# Patient Record
Sex: MALE | Race: WHITE | NOT HISPANIC OR LATINO | URBAN - METROPOLITAN AREA
[De-identification: names, ages, dates, MRNs, and addresses within clinical notes are randomized per-mention and may not be internally consistent; named-entity substitution may affect disease eponyms.]

---

## 2017-02-24 ENCOUNTER — OUTPATIENT (OUTPATIENT)
Dept: OUTPATIENT SERVICES | Facility: HOSPITAL | Age: 52
LOS: 1 days | End: 2017-02-24
Payer: COMMERCIAL

## 2017-02-24 VITALS
OXYGEN SATURATION: 98 % | TEMPERATURE: 98 F | RESPIRATION RATE: 16 BRPM | DIASTOLIC BLOOD PRESSURE: 85 MMHG | SYSTOLIC BLOOD PRESSURE: 128 MMHG | HEIGHT: 75 IN | WEIGHT: 250 LBS | HEART RATE: 66 BPM

## 2017-02-24 DIAGNOSIS — S83.282A OTHER TEAR OF LATERAL MENISCUS, CURRENT INJURY, LEFT KNEE, INITIAL ENCOUNTER: ICD-10-CM

## 2017-02-24 DIAGNOSIS — Z01.818 ENCOUNTER FOR OTHER PREPROCEDURAL EXAMINATION: ICD-10-CM

## 2017-02-24 DIAGNOSIS — Z98.890 OTHER SPECIFIED POSTPROCEDURAL STATES: Chronic | ICD-10-CM

## 2017-02-24 DIAGNOSIS — G47.33 OBSTRUCTIVE SLEEP APNEA (ADULT) (PEDIATRIC): ICD-10-CM

## 2017-02-24 PROCEDURE — G0463: CPT

## 2017-02-24 RX ORDER — CELECOXIB 200 MG/1
200 CAPSULE ORAL ONCE
Qty: 0 | Refills: 0 | Status: COMPLETED | OUTPATIENT
Start: 2017-02-27 | End: 2017-02-27

## 2017-02-24 RX ORDER — ACETAMINOPHEN 500 MG
975 TABLET ORAL ONCE
Qty: 0 | Refills: 0 | Status: COMPLETED | OUTPATIENT
Start: 2017-02-27 | End: 2017-02-27

## 2017-02-24 RX ORDER — SODIUM CHLORIDE 9 MG/ML
3 INJECTION INTRAMUSCULAR; INTRAVENOUS; SUBCUTANEOUS EVERY 8 HOURS
Qty: 0 | Refills: 0 | Status: DISCONTINUED | OUTPATIENT
Start: 2017-02-27 | End: 2017-02-27

## 2017-02-24 NOTE — H&P PST ADULT - NEGATIVE GASTROINTESTINAL SYMPTOMS
no constipation/no change in bowel habits/no diarrhea/no vomiting/no abdominal pain/no flatulence/no nausea

## 2017-02-24 NOTE — H&P PST ADULT - NEGATIVE GENERAL SYMPTOMS
no fatigue/no fever/no sweating/no polyuria/no polydipsia/no weight gain/no polyphagia/no weight loss/no chills/no anorexia

## 2017-02-24 NOTE — H&P PST ADULT - GASTROINTESTINAL DETAILS
no masses palpable/no bruit/soft/nontender/no distention/no organomegaly/bowel sounds normal/no rigidity

## 2017-02-24 NOTE — H&P PST ADULT - HISTORY OF PRESENT ILLNESS
50 y/o male with PMH hyperlipidemia and Gilbert's syndrome is here for presurgical evaluation. He injured his left knee in work-related injury and is scheduled for left knee arthroscopy on 2/27/2017 52 y/o male with PMH hyperlipidemia and Gilbert's syndrome is here for presurgical evaluation. States he fell and injured his left knee in work-related incident. Patient is scheduled for left knee arthroscopy on 2/27/2017

## 2017-02-24 NOTE — H&P PST ADULT - RS GEN PE MLT RESP DETAILS PC
breath sounds equal/clear to auscultation bilaterally/airway patent/no wheezes/no intercostal retractions/good air movement/no rales/no chest wall tenderness/normal/no subcutaneous emphysema/no rhonchi/respirations non-labored

## 2017-02-24 NOTE — H&P PST ADULT - ASSESSMENT
52 y/o male with PMH hyperlipidemia, Gilbert's syndrome, and confirmed URMILA on CPAP 0.5cm water  scheduled for left knee arthroscopy on 2/27/2017. Patient is at low risk for planned procedure 50 y/o male with PMH hyperlipidemia, Gilbert's syndrome, confirmed URMILA on CPAP 0.5cm water, and tear of lateral meniscus, left knee scheduled for left knee arthroscopy on 2/27/2017. Patient is at low risk for planned procedure

## 2017-02-24 NOTE — H&P PST ADULT - FAMILY HISTORY
Father  Still living? No  Family history of cancer, Age at diagnosis: Age Unknown     Mother  Still living? Yes, Estimated age: Age Unknown  Diabetes mellitus, type 2, Age at diagnosis: Age Unknown

## 2017-02-24 NOTE — H&P PST ADULT - PROBLEM SELECTOR PLAN 1
Scheduled for left knee arthroscopy on 2/27/2017. Preoperative clearance discussed and given to patient

## 2017-02-24 NOTE — H&P PST ADULT - NEGATIVE CARDIOVASCULAR SYMPTOMS
no paroxysmal nocturnal dyspnea/no peripheral edema/no palpitations/no claudication/no dyspnea on exertion/no chest pain/no orthopnea

## 2017-02-26 ENCOUNTER — TRANSCRIPTION ENCOUNTER (OUTPATIENT)
Age: 52
End: 2017-02-26

## 2017-02-27 ENCOUNTER — OUTPATIENT (OUTPATIENT)
Dept: OUTPATIENT SERVICES | Facility: HOSPITAL | Age: 52
LOS: 1 days | Discharge: HOME | End: 2017-02-27
Payer: COMMERCIAL

## 2017-02-27 VITALS
SYSTOLIC BLOOD PRESSURE: 121 MMHG | RESPIRATION RATE: 18 BRPM | DIASTOLIC BLOOD PRESSURE: 81 MMHG | HEART RATE: 90 BPM | OXYGEN SATURATION: 99 % | TEMPERATURE: 98 F

## 2017-02-27 VITALS
DIASTOLIC BLOOD PRESSURE: 90 MMHG | HEART RATE: 57 BPM | RESPIRATION RATE: 18 BRPM | TEMPERATURE: 98 F | SYSTOLIC BLOOD PRESSURE: 128 MMHG | HEIGHT: 75 IN | WEIGHT: 250 LBS | OXYGEN SATURATION: 99 %

## 2017-02-27 DIAGNOSIS — S83.282A OTHER TEAR OF LATERAL MENISCUS, CURRENT INJURY, LEFT KNEE, INITIAL ENCOUNTER: ICD-10-CM

## 2017-02-27 DIAGNOSIS — Z98.890 OTHER SPECIFIED POSTPROCEDURAL STATES: Chronic | ICD-10-CM

## 2017-02-27 PROCEDURE — 88304 TISSUE EXAM BY PATHOLOGIST: CPT | Mod: 26

## 2017-02-27 PROCEDURE — 29880 ARTHRS KNE SRG MNISECTMY M&L: CPT | Mod: AS,LT

## 2017-02-27 PROCEDURE — 29874 ARTHRS KNEE SURG RMV LOOS/FB: CPT | Mod: AS,LT

## 2017-02-27 PROCEDURE — 29880 ARTHRS KNE SRG MNISECTMY M&L: CPT | Mod: LT

## 2017-02-27 PROCEDURE — 29877 ARTHRS KNEE SURG DBRDMT/SHVG: CPT | Mod: AS,LT

## 2017-02-27 PROCEDURE — 88304 TISSUE EXAM BY PATHOLOGIST: CPT

## 2017-02-27 RX ORDER — HYDROMORPHONE HYDROCHLORIDE 2 MG/ML
0.5 INJECTION INTRAMUSCULAR; INTRAVENOUS; SUBCUTANEOUS
Qty: 0 | Refills: 0 | Status: DISCONTINUED | OUTPATIENT
Start: 2017-02-27 | End: 2017-02-27

## 2017-02-27 RX ORDER — SODIUM CHLORIDE 9 MG/ML
1000 INJECTION, SOLUTION INTRAVENOUS
Qty: 0 | Refills: 0 | Status: DISCONTINUED | OUTPATIENT
Start: 2017-02-27 | End: 2017-02-27

## 2017-02-27 RX ADMIN — SODIUM CHLORIDE 3 MILLILITER(S): 9 INJECTION INTRAMUSCULAR; INTRAVENOUS; SUBCUTANEOUS at 08:09

## 2017-02-27 RX ADMIN — SODIUM CHLORIDE 125 MILLILITER(S): 9 INJECTION, SOLUTION INTRAVENOUS at 10:48

## 2017-02-27 RX ADMIN — HYDROMORPHONE HYDROCHLORIDE 0.5 MILLIGRAM(S): 2 INJECTION INTRAMUSCULAR; INTRAVENOUS; SUBCUTANEOUS at 11:03

## 2017-02-27 RX ADMIN — CELECOXIB 200 MILLIGRAM(S): 200 CAPSULE ORAL at 08:07

## 2017-02-27 RX ADMIN — Medication 975 MILLIGRAM(S): at 08:07

## 2017-02-27 RX ADMIN — HYDROMORPHONE HYDROCHLORIDE 0.5 MILLIGRAM(S): 2 INJECTION INTRAMUSCULAR; INTRAVENOUS; SUBCUTANEOUS at 11:20

## 2017-02-27 NOTE — ASU PATIENT PROFILE, ADULT - NS PRO AD INFO GIVEN Y
yes/wife, on elvis;e, will be decision maker, if needed wife, on file, will be decision maker, if needed/yes

## 2017-02-27 NOTE — ASU DISCHARGE PLAN (ADULT/PEDIATRIC). - MEDICATION SUMMARY - MEDICATIONS TO TAKE
I will START or STAY ON the medications listed below when I get home from the hospital:    Percocet 5/325 oral tablet  -- 1 tab(s) by mouth every 4 hours, As needed, Moderate Pain (4 - 6) MDD:6  -- Indication: For Other tear of lateral meniscus, current injury, left knee, initial encounter    simvastatin 10 mg oral tablet  -- 1 tab(s) by mouth once a day (at bedtime)  -- Indication: For Other tear of lateral meniscus, current injury, left knee, initial encounter
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
## 2017-03-06 DIAGNOSIS — M86.9 OSTEOMYELITIS, UNSPECIFIED: ICD-10-CM

## 2017-03-06 DIAGNOSIS — E80.4 GILBERT SYNDROME: ICD-10-CM
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
## 2017-03-06 DIAGNOSIS — S83.282A OTHER TEAR OF LATERAL MENISCUS, CURRENT INJURY, LEFT KNEE, INITIAL ENCOUNTER: ICD-10-CM

## 2017-03-06 DIAGNOSIS — S83.512A SPRAIN OF ANTERIOR CRUCIATE LIGAMENT OF LEFT KNEE, INITIAL ENCOUNTER: ICD-10-CM

## 2017-03-06 DIAGNOSIS — M65.862 OTHER SYNOVITIS AND TENOSYNOVITIS, LEFT LOWER LEG: ICD-10-CM

## 2017-03-06 DIAGNOSIS — E78.5 HYPERLIPIDEMIA, UNSPECIFIED: ICD-10-CM
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
## 2017-03-06 DIAGNOSIS — S83.242A OTHER TEAR OF MEDIAL MENISCUS, CURRENT INJURY, LEFT KNEE, INITIAL ENCOUNTER: ICD-10-CM

## 2017-03-06 DIAGNOSIS — Z87.891 PERSONAL HISTORY OF NICOTINE DEPENDENCE: ICD-10-CM

## 2017-03-06 DIAGNOSIS — M17.12 UNILATERAL PRIMARY OSTEOARTHRITIS, LEFT KNEE: ICD-10-CM

## 2017-03-06 DIAGNOSIS — G47.33 OBSTRUCTIVE SLEEP APNEA (ADULT) (PEDIATRIC): ICD-10-CM

## 2018-10-18 NOTE — H&P PST ADULT - NSCAFFEAMTFREQ_GEN_ALL_CORE_SD
Anesthetic History No history of anesthetic complications Review of Systems / Medical History Patient summary reviewed, nursing notes reviewed and pertinent labs reviewed Pulmonary Within defined limits Neuro/Psych Within defined limits Psychiatric history Cardiovascular Within defined limits GI/Hepatic/Renal 
Within defined limits GERD Endo/Other Within defined limits Other Findings Physical Exam 
 
Airway Mallampati: I 
TM Distance: > 6 cm Neck ROM: normal range of motion Mouth opening: Normal 
 
 Cardiovascular Regular rate and rhythm,  S1 and S2 normal,  no murmur, click, rub, or gallop Dental 
No notable dental hx Pulmonary Breath sounds clear to auscultation Abdominal 
GI exam deferred Other Findings Anesthetic Plan ASA: 1 Anesthesia type: general 
 
 
 
 
Induction: Intravenous Anesthetic plan and risks discussed with: Patient
1-2 cups/cans per day

## 2020-02-02 ENCOUNTER — EMERGENCY (EMERGENCY)
Facility: HOSPITAL | Age: 55
LOS: 1 days | Discharge: HOME | End: 2020-02-02
Attending: EMERGENCY MEDICINE | Admitting: EMERGENCY MEDICINE
Payer: COMMERCIAL

## 2020-02-02 VITALS
TEMPERATURE: 98 F | DIASTOLIC BLOOD PRESSURE: 82 MMHG | SYSTOLIC BLOOD PRESSURE: 123 MMHG | HEART RATE: 80 BPM | OXYGEN SATURATION: 98 % | RESPIRATION RATE: 17 BRPM

## 2020-02-02 VITALS
SYSTOLIC BLOOD PRESSURE: 148 MMHG | DIASTOLIC BLOOD PRESSURE: 102 MMHG | RESPIRATION RATE: 18 BRPM | WEIGHT: 259.93 LBS | HEIGHT: 75 IN | HEART RATE: 76 BPM | OXYGEN SATURATION: 95 % | TEMPERATURE: 98 F

## 2020-02-02 DIAGNOSIS — S89.92XA UNSPECIFIED INJURY OF LEFT LOWER LEG, INITIAL ENCOUNTER: ICD-10-CM

## 2020-02-02 DIAGNOSIS — Z98.890 OTHER SPECIFIED POSTPROCEDURAL STATES: Chronic | ICD-10-CM

## 2020-02-02 PROCEDURE — 99283 EMERGENCY DEPT VISIT LOW MDM: CPT

## 2020-02-02 PROCEDURE — 73564 X-RAY EXAM KNEE 4 OR MORE: CPT | Mod: 26,LT

## 2020-02-02 PROCEDURE — 73564 X-RAY EXAM KNEE 4 OR MORE: CPT

## 2020-02-02 RX ORDER — IBUPROFEN 200 MG
600 TABLET ORAL ONCE
Refills: 0 | Status: COMPLETED | OUTPATIENT
Start: 2020-02-02 | End: 2020-02-02

## 2020-02-02 RX ADMIN — Medication 600 MILLIGRAM(S): at 12:59
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
## 2020-02-02 NOTE — ED PROVIDER NOTE - PATIENT PORTAL LINK FT
You can access the FollowMyHealth Patient Portal offered by [facility] by registering at the following website: [email or web address]. By joining [employer]’s FollowMyHealth portal, you will also be able to view your health information using other applications (apps) compatible with our system.

## 2020-02-02 NOTE — ED PROVIDER NOTE - ATTENDING CONTRIBUTION TO CARE
Dr. Cummings: I performed a face to face bedside interview with patient regarding history of present illness, review of symptoms and past medical history. I completed an independent physical exam.  I have discussed patient's plan of care with PA.   I agree with note as stated above, having amended the EMR as needed to reflect my findings.   This includes HISTORY OF PRESENT ILLNESS, HIV, PAST MEDICAL/SURGICAL/FAMILY/SOCIAL HISTORY, ALLERGIES AND HOME MEDICATIONS, REVIEW OF SYSTEMS, PHYSICAL EXAM, and any PROGRESS NOTES during the time I functioned as the attending physician for this patient.    see mdm
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
## 2020-02-03 PROBLEM — G47.33 OBSTRUCTIVE SLEEP APNEA (ADULT) (PEDIATRIC): Chronic | Status: ACTIVE | Noted: 2017-02-24

## 2020-02-03 PROBLEM — E80.4 GILBERT SYNDROME: Chronic | Status: ACTIVE | Noted: 2017-02-24

## 2020-05-15 ENCOUNTER — EMERGENCY (EMERGENCY)
Facility: HOSPITAL | Age: 55
LOS: 0 days | Discharge: HOME | End: 2020-05-15
Attending: EMERGENCY MEDICINE
Payer: COMMERCIAL

## 2020-05-15 VITALS
TEMPERATURE: 98 F | OXYGEN SATURATION: 98 % | RESPIRATION RATE: 16 BRPM | HEIGHT: 74 IN | SYSTOLIC BLOOD PRESSURE: 138 MMHG | DIASTOLIC BLOOD PRESSURE: 76 MMHG | HEART RATE: 70 BPM | WEIGHT: 259.93 LBS

## 2020-05-15 DIAGNOSIS — S80.211A ABRASION, RIGHT KNEE, INITIAL ENCOUNTER: ICD-10-CM

## 2020-05-15 DIAGNOSIS — E80.4 GILBERT SYNDROME: ICD-10-CM

## 2020-05-15 DIAGNOSIS — Z04.1 ENCOUNTER FOR EXAMINATION AND OBSERVATION FOLLOWING TRANSPORT ACCIDENT: ICD-10-CM

## 2020-05-15 DIAGNOSIS — Z98.890 OTHER SPECIFIED POSTPROCEDURAL STATES: Chronic | ICD-10-CM

## 2020-05-15 DIAGNOSIS — E78.00 PURE HYPERCHOLESTEROLEMIA, UNSPECIFIED: ICD-10-CM

## 2020-05-15 DIAGNOSIS — S39.012A STRAIN OF MUSCLE, FASCIA AND TENDON OF LOWER BACK, INITIAL ENCOUNTER: ICD-10-CM

## 2020-05-15 DIAGNOSIS — E78.5 HYPERLIPIDEMIA, UNSPECIFIED: ICD-10-CM
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
## 2020-05-15 DIAGNOSIS — G47.33 OBSTRUCTIVE SLEEP APNEA (ADULT) (PEDIATRIC): ICD-10-CM

## 2020-05-15 PROCEDURE — 99053 MED SERV 10PM-8AM 24 HR FAC: CPT

## 2020-05-15 PROCEDURE — 99283 EMERGENCY DEPT VISIT LOW MDM: CPT

## 2020-05-15 RX ORDER — IBUPROFEN 200 MG
600 TABLET ORAL ONCE
Refills: 0 | Status: COMPLETED | OUTPATIENT
Start: 2020-05-15 | End: 2020-05-15

## 2020-05-15 RX ORDER — CYCLOBENZAPRINE HYDROCHLORIDE 10 MG/1
10 TABLET, FILM COATED ORAL ONCE
Refills: 0 | Status: COMPLETED | OUTPATIENT
Start: 2020-05-15 | End: 2020-05-15

## 2020-05-15 RX ORDER — SIMVASTATIN 20 MG/1
1 TABLET, FILM COATED ORAL
Qty: 0 | Refills: 0 | DISCHARGE

## 2020-05-15 RX ORDER — IBUPROFEN 200 MG
1 TABLET ORAL
Qty: 15 | Refills: 0
Start: 2020-05-15 | End: 2020-05-19

## 2020-05-15 RX ORDER — CYCLOBENZAPRINE HYDROCHLORIDE 10 MG/1
1 TABLET, FILM COATED ORAL
Qty: 15 | Refills: 0
Start: 2020-05-15 | End: 2020-05-19

## 2020-05-15 RX ADMIN — Medication 600 MILLIGRAM(S): at 08:38

## 2020-05-15 RX ADMIN — CYCLOBENZAPRINE HYDROCHLORIDE 10 MILLIGRAM(S): 10 TABLET, FILM COATED ORAL at 08:38

## 2020-05-15 NOTE — ED PROVIDER NOTE - PATIENT PORTAL LINK FT
You can access the FollowMyHealth Patient Portal offered by [facility] by registering at the following website: [email or web address]. By joining [facility]’s FollowMyHealth portal, you will also be able to view your health information using other applications (apps) compatible with our system.
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
## 2020-05-15 NOTE — ED PROVIDER NOTE - EXTREMITY EXAM
left lower extremity findings/right lower extremity findings/left upper extremity findings/right upper extremity findings

## 2020-05-15 NOTE — ED PROVIDER NOTE - CONSTITUTIONAL, MLM
normal... Well appearing, awake, alert, oriented to person, place, time/situation and in no apparent distress. Speaking in clear full sentences no nasal flaring no shoulders retractions no diaphoresis, not holding his head/chest/abdomen, smiling pleasant appears very comfortable sitting up in the chair in a bright light room

## 2020-05-15 NOTE — ED PROVIDER NOTE - PMH
Arnett syndrome    High cholesterol    Hyperlipidemia, unspecified hyperlipidemia type    URMILA on CPAP

## 2020-05-15 NOTE — ED PROVIDER NOTE - WEIGHT BEARING
able/Pt is ambulating with normal gaits without assist, Pt is able to perform straight leg raising bilaterally on standing position

## 2020-05-15 NOTE — ED PROVIDER NOTE - CLINICAL SUMMARY MEDICAL DECISION MAKING FREE TEXT BOX
hx, exam, pt is ambulating no bony tender on exam no focal neur deficits on exam imaging studies are not indicated now pt is referred to ortho and advised to return if symptoms persist or worsen.

## 2020-05-15 NOTE — ED PROVIDER NOTE - CARE PLAN
Principal Discharge DX:	Back strain, initial encounter  Secondary Diagnosis:	Abrasion of knee, right
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
## 2020-07-07 NOTE — ASU PATIENT PROFILE, ADULT - AS SC BRADEN MOISTURE
[90: Able to carry normal activity; minor signs or symptoms of disease.] : 90: Able to carry normal activity; minor signs or symptoms of disease.  (4) rarely moist

## 2021-06-03 NOTE — ED ADULT NURSE NOTE - NSFALLRSKOUTCOME_ED_ALL_ED
Additional Notes: Patient consent was obtained to proceed with the visit and recommended plan of care after discussion of all risks and benefits, including the risks of COVID-19 exposure. Detail Level: Simple Universal Safety Interventions

## 2022-12-11 NOTE — ED PROVIDER NOTE - NONTENDER LOCATION
periumbilical/left costovertebral angle/left lower quadrant/right upper quadrant/left upper quadrant/right lower quadrant/umbilical/suprapubic/right costovertebral angle no

## 2022-12-14 ENCOUNTER — APPOINTMENT (OUTPATIENT)
Dept: ORTHOPEDIC SURGERY | Facility: CLINIC | Age: 57
End: 2022-12-14

## 2022-12-14 VITALS — BODY MASS INDEX: 34.19 KG/M2 | HEIGHT: 75 IN | WEIGHT: 275 LBS

## 2022-12-14 PROCEDURE — 99205 OFFICE O/P NEW HI 60 MIN: CPT

## 2022-12-14 PROCEDURE — 99072 ADDL SUPL MATRL&STAF TM PHE: CPT

## 2022-12-14 PROCEDURE — 73564 X-RAY EXAM KNEE 4 OR MORE: CPT | Mod: LT

## 2022-12-14 NOTE — IMAGING
[de-identified] : Examination of his left lower extremity reveals a normal neurovascular exam.  Examination of his left knee reveals a well-healed midline scar from his ACL reconstruction.  He has limited range of motion from 5 to 125 degrees with a varus deformity.  There is medial joint line pain and tenderness.  There is no instability or apprehension.  There is no redness or rashes.  Examination of his left hip is normal with full painless range of motion.

## 2022-12-14 NOTE — DATA REVIEWED
[FreeTextEntry1] : Strays done in the office today of the left knee 4 views weightbearing shows near bone-on-bone arthritis of the medial compartment consistent with severe osteoarthritis.  There is a varus deformity with moderate patellofemoral arthritis.  He has 2 ACL screws 1 in the tibia and one of the femur in good position.  There were no tumors masses or calcifications seen.
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
## 2022-12-14 NOTE — HISTORY OF PRESENT ILLNESS
[8] : 8 [7] : 7 [Localized] : localized [Sharp] : sharp [Full time] : Work status: full time [] : Post Surgical Visit: no [FreeTextEntry1] : L Knee [FreeTextEntry3] : 10/11/2011 [FreeTextEntry5] : 58 Y/O LHD M WC eval L Knee S/P trip and fall off a step ladder at work on above WC DOI HX of L knee arthroscopy and nerve repair  [de-identified] : None [de-identified] : Adan

## 2023-01-12 ENCOUNTER — APPOINTMENT (OUTPATIENT)
Dept: ORTHOPEDIC SURGERY | Facility: CLINIC | Age: 58
End: 2023-01-12
Payer: OTHER MISCELLANEOUS

## 2023-01-12 VITALS — BODY MASS INDEX: 34.19 KG/M2 | WEIGHT: 275 LBS | HEIGHT: 75 IN

## 2023-01-12 PROCEDURE — 99214 OFFICE O/P EST MOD 30 MIN: CPT

## 2023-01-12 PROCEDURE — 99072 ADDL SUPL MATRL&STAF TM PHE: CPT
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
## 2023-01-12 NOTE — IMAGING
[de-identified] : Examination of the left lower extremity reveals normal neurovascular exam.  Examination of the left knee reveals worsening range of motion now from 10 to 115 degrees.  There is significant varus deformity with a 1+ effusion.  There is medial joint line pain with a positive Pao's sign.  There is no lateral joint complaints or instability.  There is no redness or rashes.  His scars are all well healed.  His hip exam is normal.

## 2023-01-12 NOTE — ASSESSMENT
[FreeTextEntry1] : Patient comes in today for follow-up on his left knee.  He has progressively worsening pain of his left knee since his last office visit.  His pain is now rated at severe.  He continues to work to the best of his ability but it is becoming more more difficult.  He is essentially crawling up the stairs and has to take an elevator at work.  He is walking with a severe limp and occasionally requires use of a cane.  He can no longer walk more than a block without severe discomfort.  He has being awakened at night almost every night.  He has trouble getting in and out of a car and going from sitting to standing.  This is clearly worse than he was 6 weeks ago.  He has had multiple surgeries on his knee and his knee continues to worsen.  He has had several hyaluronic acid injections with no relief.  He had a cortisone injection many years ago and had a bad reaction to it and therefore can no longer have cortisone injections.  He has had physical therapy multiple times with no relief.  He has modified his activities and done home exercises.  He is taken anti-inflammatories and Tylenol with minimal relief.  He has clearly deteriorated and we may need to keep him out of work.

## 2023-01-12 NOTE — HISTORY OF PRESENT ILLNESS
[8] : 8 [7] : 7 [Localized] : localized [Sharp] : sharp [Full time] : Work status: full time [] : Post Surgical Visit: no [FreeTextEntry1] : L Knee [FreeTextEntry3] : 10/11/2011 [FreeTextEntry5] : 56 Y/O LHD M WC eval L Knee S/P trip and fall off a step ladder at work on above WC DOI HX of L knee arthroscopy and nerve repair. pt states no noticeable change in condition since last visit  [de-identified] : None [de-identified] : Adan

## 2023-01-12 NOTE — DATA REVIEWED
[FreeTextEntry1] : Review of his previous x-rays from December 2022 show bone-on-bone arthritis of the medial compartment with deformity of the surface of the femoral condyle.  He has osteoarthritic changes in both the patella compartment and the lateral compartment.  He has evidence of previous hardware from his ACL reconstructions.  This is clearly tricompartmental arthritis.  There were no tumors or masses seen.

## 2023-01-25 ENCOUNTER — FORM ENCOUNTER (OUTPATIENT)
Age: 58
End: 2023-01-25

## 2023-02-02 ENCOUNTER — NON-APPOINTMENT (OUTPATIENT)
Age: 58
End: 2023-02-02

## 2023-02-27 ENCOUNTER — FORM ENCOUNTER (OUTPATIENT)
Age: 58
End: 2023-02-27

## 2023-03-01 ENCOUNTER — NON-APPOINTMENT (OUTPATIENT)
Age: 58
End: 2023-03-01

## 2023-03-01 ENCOUNTER — OUTPATIENT (OUTPATIENT)
Dept: OUTPATIENT SERVICES | Facility: HOSPITAL | Age: 58
LOS: 1 days | End: 2023-03-01
Payer: OTHER MISCELLANEOUS

## 2023-03-01 ENCOUNTER — RESULT REVIEW (OUTPATIENT)
Age: 58
End: 2023-03-01

## 2023-03-01 VITALS
HEIGHT: 75 IN | DIASTOLIC BLOOD PRESSURE: 82 MMHG | OXYGEN SATURATION: 97 % | TEMPERATURE: 98 F | SYSTOLIC BLOOD PRESSURE: 141 MMHG | WEIGHT: 268.08 LBS | RESPIRATION RATE: 16 BRPM | HEART RATE: 70 BPM

## 2023-03-01 DIAGNOSIS — Z01.818 ENCOUNTER FOR OTHER PREPROCEDURAL EXAMINATION: ICD-10-CM

## 2023-03-01 DIAGNOSIS — Z98.890 OTHER SPECIFIED POSTPROCEDURAL STATES: Chronic | ICD-10-CM

## 2023-03-01 DIAGNOSIS — M25.562 PAIN IN LEFT KNEE: ICD-10-CM

## 2023-03-01 DIAGNOSIS — M17.32 UNILATERAL POST-TRAUMATIC OSTEOARTHRITIS, LEFT KNEE: ICD-10-CM

## 2023-03-01 DIAGNOSIS — G47.33 OBSTRUCTIVE SLEEP APNEA (ADULT) (PEDIATRIC): ICD-10-CM

## 2023-03-01 LAB
A1C WITH ESTIMATED AVERAGE GLUCOSE RESULT: 5.3 % — SIGNIFICANT CHANGE UP (ref 4–5.6)
ABO RH CONFIRMATION: SIGNIFICANT CHANGE UP
ANION GAP SERPL CALC-SCNC: 5 MMOL/L — SIGNIFICANT CHANGE UP (ref 5–17)
APTT BLD: 32.3 SEC — SIGNIFICANT CHANGE UP (ref 27.5–35.5)
BASOPHILS # BLD AUTO: 0.04 K/UL — SIGNIFICANT CHANGE UP (ref 0–0.2)
BASOPHILS NFR BLD AUTO: 0.5 % — SIGNIFICANT CHANGE UP (ref 0–2)
BLD GP AB SCN SERPL QL: SIGNIFICANT CHANGE UP
BUN SERPL-MCNC: 20 MG/DL — SIGNIFICANT CHANGE UP (ref 7–23)
CALCIUM SERPL-MCNC: 9.5 MG/DL — SIGNIFICANT CHANGE UP (ref 8.5–10.1)
CHLORIDE SERPL-SCNC: 113 MMOL/L — HIGH (ref 96–108)
CO2 SERPL-SCNC: 24 MMOL/L — SIGNIFICANT CHANGE UP (ref 22–31)
CREAT SERPL-MCNC: 1.07 MG/DL — SIGNIFICANT CHANGE UP (ref 0.5–1.3)
EGFR: 80 ML/MIN/1.73M2 — SIGNIFICANT CHANGE UP
EOSINOPHIL # BLD AUTO: 0.15 K/UL — SIGNIFICANT CHANGE UP (ref 0–0.5)
EOSINOPHIL NFR BLD AUTO: 1.7 % — SIGNIFICANT CHANGE UP (ref 0–6)
ESTIMATED AVERAGE GLUCOSE: 105 MG/DL — SIGNIFICANT CHANGE UP (ref 68–114)
GLUCOSE SERPL-MCNC: 94 MG/DL — SIGNIFICANT CHANGE UP (ref 70–99)
HCT VFR BLD CALC: 46.2 % — SIGNIFICANT CHANGE UP (ref 39–50)
HGB BLD-MCNC: 16.3 G/DL — SIGNIFICANT CHANGE UP (ref 13–17)
IMM GRANULOCYTES NFR BLD AUTO: 0.6 % — SIGNIFICANT CHANGE UP (ref 0–0.9)
INR BLD: 1.06 RATIO — SIGNIFICANT CHANGE UP (ref 0.88–1.16)
LYMPHOCYTES # BLD AUTO: 2.31 K/UL — SIGNIFICANT CHANGE UP (ref 1–3.3)
LYMPHOCYTES # BLD AUTO: 26.1 % — SIGNIFICANT CHANGE UP (ref 13–44)
MCHC RBC-ENTMCNC: 32.6 PG — SIGNIFICANT CHANGE UP (ref 27–34)
MCHC RBC-ENTMCNC: 35.3 GM/DL — SIGNIFICANT CHANGE UP (ref 32–36)
MCV RBC AUTO: 92.4 FL — SIGNIFICANT CHANGE UP (ref 80–100)
MONOCYTES # BLD AUTO: 0.88 K/UL — SIGNIFICANT CHANGE UP (ref 0–0.9)
MONOCYTES NFR BLD AUTO: 9.9 % — SIGNIFICANT CHANGE UP (ref 2–14)
MRSA PCR RESULT.: SIGNIFICANT CHANGE UP
NEUTROPHILS # BLD AUTO: 5.43 K/UL — SIGNIFICANT CHANGE UP (ref 1.8–7.4)
NEUTROPHILS NFR BLD AUTO: 61.2 % — SIGNIFICANT CHANGE UP (ref 43–77)
PLATELET # BLD AUTO: 220 K/UL — SIGNIFICANT CHANGE UP (ref 150–400)
POTASSIUM SERPL-MCNC: 3.7 MMOL/L — SIGNIFICANT CHANGE UP (ref 3.5–5.3)
POTASSIUM SERPL-SCNC: 3.7 MMOL/L — SIGNIFICANT CHANGE UP (ref 3.5–5.3)
PROTHROM AB SERPL-ACNC: 12.3 SEC — SIGNIFICANT CHANGE UP (ref 10.5–13.4)
RBC # BLD: 5 M/UL — SIGNIFICANT CHANGE UP (ref 4.2–5.8)
RBC # FLD: 12.3 % — SIGNIFICANT CHANGE UP (ref 10.3–14.5)
S AUREUS DNA NOSE QL NAA+PROBE: SIGNIFICANT CHANGE UP
SODIUM SERPL-SCNC: 142 MMOL/L — SIGNIFICANT CHANGE UP (ref 135–145)
WBC # BLD: 8.86 K/UL — SIGNIFICANT CHANGE UP (ref 3.8–10.5)
WBC # FLD AUTO: 8.86 K/UL — SIGNIFICANT CHANGE UP (ref 3.8–10.5)

## 2023-03-01 PROCEDURE — 86900 BLOOD TYPING SEROLOGIC ABO: CPT

## 2023-03-01 PROCEDURE — 87641 MR-STAPH DNA AMP PROBE: CPT

## 2023-03-01 PROCEDURE — 93005 ELECTROCARDIOGRAM TRACING: CPT

## 2023-03-01 PROCEDURE — 93010 ELECTROCARDIOGRAM REPORT: CPT

## 2023-03-01 PROCEDURE — 85610 PROTHROMBIN TIME: CPT

## 2023-03-01 PROCEDURE — 71046 X-RAY EXAM CHEST 2 VIEWS: CPT | Mod: 26

## 2023-03-01 PROCEDURE — 36415 COLL VENOUS BLD VENIPUNCTURE: CPT

## 2023-03-01 PROCEDURE — 85730 THROMBOPLASTIN TIME PARTIAL: CPT

## 2023-03-01 PROCEDURE — 87640 STAPH A DNA AMP PROBE: CPT

## 2023-03-01 PROCEDURE — 80048 BASIC METABOLIC PNL TOTAL CA: CPT

## 2023-03-01 PROCEDURE — 99214 OFFICE O/P EST MOD 30 MIN: CPT | Mod: 25

## 2023-03-01 PROCEDURE — 71046 X-RAY EXAM CHEST 2 VIEWS: CPT

## 2023-03-01 PROCEDURE — 83036 HEMOGLOBIN GLYCOSYLATED A1C: CPT

## 2023-03-01 PROCEDURE — 85025 COMPLETE CBC W/AUTO DIFF WBC: CPT

## 2023-03-01 PROCEDURE — 86850 RBC ANTIBODY SCREEN: CPT

## 2023-03-01 PROCEDURE — 86901 BLOOD TYPING SEROLOGIC RH(D): CPT

## 2023-03-01 NOTE — H&P PST ADULT - NSICDXFAMILYHX_GEN_ALL_CORE_FT
FAMILY HISTORY:  Father  Still living? No  Family history of cancer, Age at diagnosis: Age Unknown    Mother  Still living? Yes, Estimated age: Age Unknown  Diabetes mellitus, type 2, Age at diagnosis: Age Unknown

## 2023-03-01 NOTE — H&P PST ADULT - HISTORY OF COVID-19 VACCINATION
Regarding: Daughter is schedule of have follow up appointment but is complain of ear ache again   ----- Message from Jessica Setting sent at 11/13/2022  5:44 PM EST -----  '' My daughter has an follow appointment schedule for 11/16 but she starting to complain that her ear is hurting again would like to get an appointemnt for tomorrow  '' Yes

## 2023-03-01 NOTE — H&P PST ADULT - PROBLEM SELECTOR PLAN 1
Pre op and chlorhexidine instructions given and explained.  Avoid NSAIDs and OTC supplements.   Patient verbalized understanding  medical consult requestedby surgeon  covid 19 swab scheduled, advised to quarantine after test

## 2023-03-01 NOTE — H&P PST ADULT - ASSESSMENT
58 y/o male with hx of HLD presents to PST for scheduled left knee replacement on 3/14/23.     CAPRINI SCORE    AGE RELATED RISK FACTORS                                                       MOBILITY RELATED FACTORS  [x ] Age 41-60 years                                            (1 Point)                  [ ] Bed rest                                                        (1 Point)  [ ] Age: 61-74 years                                           (2 Points)                [ ] Plaster cast                                                   (2 Points)  [ ] Age= 75 years                                              (3 Points)                 [ ] Bed bound for more than 72 hours                   (2 Points)    DISEASE RELATED RISK FACTORS                                               GENDER SPECIFIC FACTORS  [ ] Edema in the lower extremities                       (1 Point)                  [ ] Pregnancy                                                     (1 Point)  [ ] Varicose veins                                               (1 Point)                  [ ] Post-partum < 6 weeks                                   (1 Point)             [ ] BMI > 25 Kg/m2                                            (1 Point)                  [ ] Hormonal therapy  or oral contraception            (1 Point)                 [ ] Sepsis (in the previous month)                        (1 Point)                  [ ] History of pregnancy complications  [ ] Pneumonia or serious lung disease                                               [ ] Unexplained or recurrent                       (1 Point)           (in the previous month)                               (1 Point)  [ ] Abnormal pulmonary function test                     (1 Point)                 SURGERY RELATED RISK FACTORS  [ ] Acute myocardial infarction                              (1 Point)                 [ ]  Section                                            (1 Point)  [ ] Congestive heart failure (in the previous month)  (1 Point)                 [ ] Minor surgery                                                 (1 Point)   [ ] Inflammatory bowel disease                             (1 Point)                 [ ] Arthroscopic surgery                                        (2 Points)  [ ] Central venous access                                    (2 Points)                [ ] General surgery lasting more than 45 minutes   (2 Points)       [ ] Stroke (in the previous month)                          (5 Points)               [x ] Elective arthroplasty                                        (5 Points)            [ ] malignancy                                                             (2 points)                                                                                                                                 HEMATOLOGY RELATED FACTORS                                                 TRAUMA RELATED RISK FACTORS  [ ] Prior episodes of VTE                                     (3 Points)                 [ ] Fracture of the hip, pelvis, or leg                       (5 Points)  [ ] Positive family history for VTE                         (3 Points)                 [ ] Acute spinal cord injury (in the previous month)  (5 Points)  [ ] Prothrombin 28996 A                                      (3 Points)                 [ ] Paralysis  (less than 1 month)                          (5 Points)  [ ] Factor V Leiden                                             (3 Points)                 [ ] Multiple Trauma within 1 month                         (5 Points)  [ ] Lupus anticoagulants                                     (3 Points)                                                           [ ] Anticardiolipin antibodies                                (3 Points)                                                       [ ] High homocysteine in the blood                      (3 Points)                                             [ ] Other congenital or acquired thrombophilia       (3 Points)                                                [ ] Heparin induced thrombocytopenia                  (3 Points)                                          Total Score [  6        )  The Caprini score indicates that this patient is at high risk for a VTE event (score 6 or greater). Surgical patients in this group will benefit from both pharmacologic prophylaxis and intermittent compression devices.  The surgical team will determine the balance between VTE risk and bleeding risk, and other clinical considerations

## 2023-03-01 NOTE — H&P PST ADULT - HISTORY OF PRESENT ILLNESS
58 y/o male with hx of HLD presents to PST for scheduled left knee replacement on 3/14/23. Patient reports pain to left knee s/p work related injury 2011, s/p 2 arthroscopy with no relief and steroid injection.

## 2023-03-01 NOTE — H&P PST ADULT - NSICDXPASTMEDICALHX_GEN_ALL_CORE_FT
PAST MEDICAL HISTORY:  Bois D Arc syndrome     High cholesterol     Left knee pain     URMILA on CPAP

## 2023-03-02 DIAGNOSIS — Z01.818 ENCOUNTER FOR OTHER PREPROCEDURAL EXAMINATION: ICD-10-CM

## 2023-03-02 DIAGNOSIS — M17.32 UNILATERAL POST-TRAUMATIC OSTEOARTHRITIS, LEFT KNEE: ICD-10-CM

## 2023-03-08 ENCOUNTER — APPOINTMENT (OUTPATIENT)
Dept: FAMILY MEDICINE | Facility: CLINIC | Age: 58
End: 2023-03-08
Payer: OTHER MISCELLANEOUS

## 2023-03-08 ENCOUNTER — NON-APPOINTMENT (OUTPATIENT)
Age: 58
End: 2023-03-08

## 2023-03-08 VITALS
BODY MASS INDEX: 34.19 KG/M2 | HEART RATE: 68 BPM | WEIGHT: 275 LBS | DIASTOLIC BLOOD PRESSURE: 74 MMHG | TEMPERATURE: 97.6 F | OXYGEN SATURATION: 97 % | HEIGHT: 75 IN | SYSTOLIC BLOOD PRESSURE: 116 MMHG

## 2023-03-08 DIAGNOSIS — Z78.9 OTHER SPECIFIED HEALTH STATUS: ICD-10-CM

## 2023-03-08 DIAGNOSIS — G47.33 OBSTRUCTIVE SLEEP APNEA (ADULT) (PEDIATRIC): ICD-10-CM

## 2023-03-08 DIAGNOSIS — Z87.891 PERSONAL HISTORY OF NICOTINE DEPENDENCE: ICD-10-CM

## 2023-03-08 PROCEDURE — 99205 OFFICE O/P NEW HI 60 MIN: CPT

## 2023-03-08 PROCEDURE — 99072 ADDL SUPL MATRL&STAF TM PHE: CPT

## 2023-03-11 ENCOUNTER — LABORATORY RESULT (OUTPATIENT)
Age: 58
End: 2023-03-11

## 2023-03-13 RX ORDER — TRANEXAMIC ACID 100 MG/ML
1000 INJECTION, SOLUTION INTRAVENOUS ONCE
Refills: 0 | Status: COMPLETED | OUTPATIENT
Start: 2023-03-14 | End: 2023-03-14

## 2023-03-14 ENCOUNTER — TRANSCRIPTION ENCOUNTER (OUTPATIENT)
Age: 58
End: 2023-03-14

## 2023-03-14 ENCOUNTER — RESULT REVIEW (OUTPATIENT)
Age: 58
End: 2023-03-14

## 2023-03-14 ENCOUNTER — INPATIENT (INPATIENT)
Facility: HOSPITAL | Age: 58
LOS: 0 days | Discharge: HOME | DRG: 302 | End: 2023-03-15
Attending: ORTHOPAEDIC SURGERY | Admitting: ORTHOPAEDIC SURGERY
Payer: OTHER MISCELLANEOUS

## 2023-03-14 ENCOUNTER — APPOINTMENT (OUTPATIENT)
Dept: ORTHOPEDIC SURGERY | Facility: HOSPITAL | Age: 58
End: 2023-03-14

## 2023-03-14 VITALS
WEIGHT: 268.08 LBS | RESPIRATION RATE: 16 BRPM | DIASTOLIC BLOOD PRESSURE: 90 MMHG | OXYGEN SATURATION: 98 % | HEART RATE: 68 BPM | TEMPERATURE: 98 F | HEIGHT: 63 IN | SYSTOLIC BLOOD PRESSURE: 148 MMHG

## 2023-03-14 DIAGNOSIS — M17.32 UNILATERAL POST-TRAUMATIC OSTEOARTHRITIS, LEFT KNEE: ICD-10-CM

## 2023-03-14 DIAGNOSIS — Z98.890 OTHER SPECIFIED POSTPROCEDURAL STATES: Chronic | ICD-10-CM

## 2023-03-14 LAB
ANION GAP SERPL CALC-SCNC: 5 MMOL/L — SIGNIFICANT CHANGE UP (ref 5–17)
BUN SERPL-MCNC: 24 MG/DL — HIGH (ref 7–23)
CALCIUM SERPL-MCNC: 8.8 MG/DL — SIGNIFICANT CHANGE UP (ref 8.5–10.1)
CHLORIDE SERPL-SCNC: 111 MMOL/L — HIGH (ref 96–108)
CO2 SERPL-SCNC: 26 MMOL/L — SIGNIFICANT CHANGE UP (ref 22–31)
CREAT SERPL-MCNC: 1 MG/DL — SIGNIFICANT CHANGE UP (ref 0.5–1.3)
EGFR: 87 ML/MIN/1.73M2 — SIGNIFICANT CHANGE UP
GLUCOSE BLDC GLUCOMTR-MCNC: 102 MG/DL — HIGH (ref 70–99)
GLUCOSE BLDC GLUCOMTR-MCNC: 102 MG/DL — HIGH (ref 70–99)
GLUCOSE BLDC GLUCOMTR-MCNC: 175 MG/DL — HIGH (ref 70–99)
GLUCOSE SERPL-MCNC: 114 MG/DL — HIGH (ref 70–99)
HCT VFR BLD CALC: 43.9 % — SIGNIFICANT CHANGE UP (ref 39–50)
HGB BLD-MCNC: 15.6 G/DL — SIGNIFICANT CHANGE UP (ref 13–17)
MCHC RBC-ENTMCNC: 33 PG — SIGNIFICANT CHANGE UP (ref 27–34)
MCHC RBC-ENTMCNC: 35.5 GM/DL — SIGNIFICANT CHANGE UP (ref 32–36)
MCV RBC AUTO: 92.8 FL — SIGNIFICANT CHANGE UP (ref 80–100)
PLATELET # BLD AUTO: 214 K/UL — SIGNIFICANT CHANGE UP (ref 150–400)
POTASSIUM SERPL-MCNC: 4.1 MMOL/L — SIGNIFICANT CHANGE UP (ref 3.5–5.3)
POTASSIUM SERPL-SCNC: 4.1 MMOL/L — SIGNIFICANT CHANGE UP (ref 3.5–5.3)
RBC # BLD: 4.73 M/UL — SIGNIFICANT CHANGE UP (ref 4.2–5.8)
RBC # FLD: 12.6 % — SIGNIFICANT CHANGE UP (ref 10.3–14.5)
SARS-COV-2 RNA SPEC QL NAA+PROBE: SIGNIFICANT CHANGE UP
SODIUM SERPL-SCNC: 142 MMOL/L — SIGNIFICANT CHANGE UP (ref 135–145)
WBC # BLD: 10.12 K/UL — SIGNIFICANT CHANGE UP (ref 3.8–10.5)
WBC # FLD AUTO: 10.12 K/UL — SIGNIFICANT CHANGE UP (ref 3.8–10.5)

## 2023-03-14 PROCEDURE — 97530 THERAPEUTIC ACTIVITIES: CPT | Mod: GP

## 2023-03-14 PROCEDURE — 73560 X-RAY EXAM OF KNEE 1 OR 2: CPT | Mod: LT

## 2023-03-14 PROCEDURE — C1713: CPT

## 2023-03-14 PROCEDURE — 85027 COMPLETE CBC AUTOMATED: CPT

## 2023-03-14 PROCEDURE — 80048 BASIC METABOLIC PNL TOTAL CA: CPT

## 2023-03-14 PROCEDURE — 97162 PT EVAL MOD COMPLEX 30 MIN: CPT | Mod: GP

## 2023-03-14 PROCEDURE — 73560 X-RAY EXAM OF KNEE 1 OR 2: CPT | Mod: 26,LT

## 2023-03-14 PROCEDURE — 36415 COLL VENOUS BLD VENIPUNCTURE: CPT

## 2023-03-14 PROCEDURE — 27447 TOTAL KNEE ARTHROPLASTY: CPT | Mod: LT

## 2023-03-14 PROCEDURE — 82962 GLUCOSE BLOOD TEST: CPT

## 2023-03-14 PROCEDURE — 20985 CPTR-ASST DIR MS PX: CPT

## 2023-03-14 PROCEDURE — 88305 TISSUE EXAM BY PATHOLOGIST: CPT

## 2023-03-14 PROCEDURE — 20680 REMOVAL OF IMPLANT DEEP: CPT | Mod: 59,LT

## 2023-03-14 PROCEDURE — C1776: CPT

## 2023-03-14 PROCEDURE — 88305 TISSUE EXAM BY PATHOLOGIST: CPT | Mod: 26

## 2023-03-14 PROCEDURE — 87635 SARS-COV-2 COVID-19 AMP PRB: CPT

## 2023-03-14 PROCEDURE — 99024 POSTOP FOLLOW-UP VISIT: CPT

## 2023-03-14 RX ORDER — PANTOPRAZOLE SODIUM 20 MG/1
40 TABLET, DELAYED RELEASE ORAL ONCE
Refills: 0 | Status: COMPLETED | OUTPATIENT
Start: 2023-03-14 | End: 2023-03-14

## 2023-03-14 RX ORDER — FENTANYL CITRATE 50 UG/ML
50 INJECTION INTRAVENOUS
Refills: 0 | Status: DISCONTINUED | OUTPATIENT
Start: 2023-03-14 | End: 2023-03-14

## 2023-03-14 RX ORDER — OXYCODONE HYDROCHLORIDE 5 MG/1
1 TABLET ORAL
Qty: 30 | Refills: 0
Start: 2023-03-14 | End: 2023-03-18

## 2023-03-14 RX ORDER — PANTOPRAZOLE SODIUM 20 MG/1
1 TABLET, DELAYED RELEASE ORAL
Qty: 30 | Refills: 0
Start: 2023-03-14 | End: 2023-04-12

## 2023-03-14 RX ORDER — ENOXAPARIN SODIUM 100 MG/ML
40 INJECTION SUBCUTANEOUS EVERY 24 HOURS
Refills: 0 | Status: DISCONTINUED | OUTPATIENT
Start: 2023-03-15 | End: 2023-03-15

## 2023-03-14 RX ORDER — HYDROMORPHONE HYDROCHLORIDE 2 MG/ML
0.5 INJECTION INTRAMUSCULAR; INTRAVENOUS; SUBCUTANEOUS EVERY 4 HOURS
Refills: 0 | Status: DISCONTINUED | OUTPATIENT
Start: 2023-03-14 | End: 2023-03-15

## 2023-03-14 RX ORDER — SODIUM CHLORIDE 9 MG/ML
1000 INJECTION, SOLUTION INTRAVENOUS
Refills: 0 | Status: DISCONTINUED | OUTPATIENT
Start: 2023-03-14 | End: 2023-03-15

## 2023-03-14 RX ORDER — SENNA PLUS 8.6 MG/1
2 TABLET ORAL AT BEDTIME
Refills: 0 | Status: DISCONTINUED | OUTPATIENT
Start: 2023-03-14 | End: 2023-03-15

## 2023-03-14 RX ORDER — SIMVASTATIN 20 MG/1
1 TABLET, FILM COATED ORAL
Qty: 0 | Refills: 0 | DISCHARGE

## 2023-03-14 RX ORDER — CELECOXIB 200 MG/1
200 CAPSULE ORAL EVERY 12 HOURS
Refills: 0 | Status: DISCONTINUED | OUTPATIENT
Start: 2023-03-15 | End: 2023-03-15

## 2023-03-14 RX ORDER — HYDROMORPHONE HYDROCHLORIDE 2 MG/ML
0.5 INJECTION INTRAMUSCULAR; INTRAVENOUS; SUBCUTANEOUS
Refills: 0 | Status: DISCONTINUED | OUTPATIENT
Start: 2023-03-14 | End: 2023-03-14

## 2023-03-14 RX ORDER — ASCORBIC ACID 60 MG
1 TABLET,CHEWABLE ORAL
Qty: 0 | Refills: 0 | DISCHARGE

## 2023-03-14 RX ORDER — SENNA PLUS 8.6 MG/1
2 TABLET ORAL
Qty: 28 | Refills: 0
Start: 2023-03-14 | End: 2023-03-27

## 2023-03-14 RX ORDER — POLYETHYLENE GLYCOL 3350 17 G/17G
17 POWDER, FOR SOLUTION ORAL
Qty: 1 | Refills: 0
Start: 2023-03-14 | End: 2023-03-27

## 2023-03-14 RX ORDER — ASPIRIN/CALCIUM CARB/MAGNESIUM 324 MG
81 TABLET ORAL
Refills: 0 | Status: DISCONTINUED | OUTPATIENT
Start: 2023-03-14 | End: 2023-03-15

## 2023-03-14 RX ORDER — ACETAMINOPHEN 500 MG
2 TABLET ORAL
Qty: 84 | Refills: 0
Start: 2023-03-14 | End: 2023-03-27

## 2023-03-14 RX ORDER — POLYETHYLENE GLYCOL 3350 17 G/17G
17 POWDER, FOR SOLUTION ORAL AT BEDTIME
Refills: 0 | Status: DISCONTINUED | OUTPATIENT
Start: 2023-03-14 | End: 2023-03-15

## 2023-03-14 RX ORDER — ACETAMINOPHEN 500 MG
1000 TABLET ORAL ONCE
Refills: 0 | Status: COMPLETED | OUTPATIENT
Start: 2023-03-14 | End: 2023-03-14

## 2023-03-14 RX ORDER — OXYCODONE HYDROCHLORIDE 5 MG/1
10 TABLET ORAL EVERY 4 HOURS
Refills: 0 | Status: DISCONTINUED | OUTPATIENT
Start: 2023-03-14 | End: 2023-03-15

## 2023-03-14 RX ORDER — PANTOPRAZOLE SODIUM 20 MG/1
40 TABLET, DELAYED RELEASE ORAL
Refills: 0 | Status: DISCONTINUED | OUTPATIENT
Start: 2023-03-15 | End: 2023-03-15

## 2023-03-14 RX ORDER — CEFAZOLIN SODIUM 1 G
2000 VIAL (EA) INJECTION EVERY 8 HOURS
Refills: 0 | Status: COMPLETED | OUTPATIENT
Start: 2023-03-14 | End: 2023-03-15

## 2023-03-14 RX ORDER — ACETAMINOPHEN 500 MG
975 TABLET ORAL EVERY 8 HOURS
Refills: 0 | Status: DISCONTINUED | OUTPATIENT
Start: 2023-03-14 | End: 2023-03-15

## 2023-03-14 RX ORDER — ONDANSETRON 8 MG/1
4 TABLET, FILM COATED ORAL ONCE
Refills: 0 | Status: DISCONTINUED | OUTPATIENT
Start: 2023-03-14 | End: 2023-03-14

## 2023-03-14 RX ORDER — SIMVASTATIN 20 MG/1
20 TABLET, FILM COATED ORAL AT BEDTIME
Refills: 0 | Status: DISCONTINUED | OUTPATIENT
Start: 2023-03-14 | End: 2023-03-15

## 2023-03-14 RX ORDER — ONDANSETRON 8 MG/1
4 TABLET, FILM COATED ORAL EVERY 6 HOURS
Refills: 0 | Status: DISCONTINUED | OUTPATIENT
Start: 2023-03-14 | End: 2023-03-15

## 2023-03-14 RX ORDER — INFLUENZA VIRUS VACCINE 15; 15; 15; 15 UG/.5ML; UG/.5ML; UG/.5ML; UG/.5ML
0.5 SUSPENSION INTRAMUSCULAR ONCE
Refills: 0 | Status: COMPLETED | OUTPATIENT
Start: 2023-03-14 | End: 2023-03-14

## 2023-03-14 RX ORDER — OXYCODONE HYDROCHLORIDE 5 MG/1
5 TABLET ORAL EVERY 4 HOURS
Refills: 0 | Status: DISCONTINUED | OUTPATIENT
Start: 2023-03-14 | End: 2023-03-15

## 2023-03-14 RX ORDER — OXYCODONE HYDROCHLORIDE 5 MG/1
5 TABLET ORAL ONCE
Refills: 0 | Status: DISCONTINUED | OUTPATIENT
Start: 2023-03-14 | End: 2023-03-14

## 2023-03-14 RX ORDER — SODIUM CHLORIDE 9 MG/ML
1000 INJECTION, SOLUTION INTRAVENOUS
Refills: 0 | Status: DISCONTINUED | OUTPATIENT
Start: 2023-03-14 | End: 2023-03-14

## 2023-03-14 RX ORDER — DEXAMETHASONE 0.5 MG/5ML
8 ELIXIR ORAL ONCE
Refills: 0 | Status: COMPLETED | OUTPATIENT
Start: 2023-03-15 | End: 2023-03-15

## 2023-03-14 RX ADMIN — OXYCODONE HYDROCHLORIDE 5 MILLIGRAM(S): 5 TABLET ORAL at 18:12

## 2023-03-14 RX ADMIN — Medication 100 MILLIGRAM(S): at 22:12

## 2023-03-14 RX ADMIN — SODIUM CHLORIDE 125 MILLILITER(S): 9 INJECTION, SOLUTION INTRAVENOUS at 18:12

## 2023-03-14 RX ADMIN — Medication 400 MILLIGRAM(S): at 17:58

## 2023-03-14 RX ADMIN — SENNA PLUS 2 TABLET(S): 8.6 TABLET ORAL at 22:11

## 2023-03-14 RX ADMIN — Medication 1000 MILLIGRAM(S): at 18:13

## 2023-03-14 RX ADMIN — POLYETHYLENE GLYCOL 3350 17 GRAM(S): 17 POWDER, FOR SOLUTION ORAL at 22:12

## 2023-03-14 RX ADMIN — Medication 975 MILLIGRAM(S): at 22:11

## 2023-03-14 RX ADMIN — PANTOPRAZOLE SODIUM 40 MILLIGRAM(S): 20 TABLET, DELAYED RELEASE ORAL at 11:13

## 2023-03-14 RX ADMIN — TRANEXAMIC ACID 220 MILLIGRAM(S): 100 INJECTION, SOLUTION INTRAVENOUS at 14:00

## 2023-03-14 RX ADMIN — Medication 81 MILLIGRAM(S): at 22:11

## 2023-03-14 RX ADMIN — SIMVASTATIN 20 MILLIGRAM(S): 20 TABLET, FILM COATED ORAL at 22:11

## 2023-03-14 RX ADMIN — SODIUM CHLORIDE 125 MILLILITER(S): 9 INJECTION, SOLUTION INTRAVENOUS at 16:32

## 2023-03-14 RX ADMIN — TRANEXAMIC ACID 220 MILLIGRAM(S): 100 INJECTION, SOLUTION INTRAVENOUS at 15:00

## 2023-03-14 RX ADMIN — OXYCODONE HYDROCHLORIDE 5 MILLIGRAM(S): 5 TABLET ORAL at 18:24

## 2023-03-14 NOTE — PHYSICAL THERAPY INITIAL EVALUATION ADULT - OCCUPATION
working overseeing  building maintenance until 2 weeks ago, wants to retire ,does not have to go right back to work

## 2023-03-14 NOTE — DISCHARGE NOTE PROVIDER - NSDCFUADDINST_GEN_ALL_CORE_FT
Discharge Instructions for Left Total Knee Arthroplasty:    1. ACTIVITY: WBAT, Rolling walker, Daily PT. Gentle ROM 0-full as tolerated. Walk plenty.  Knee Immobilizer at night time only for 3 weeks.  2. CALL FOR: fever over 101, wound redness, drainage or open area, calf pain/calf swelling.  3. BANDAGE: Change dressing to a new Mepilex Ag bandage POD7 (3/21/23). May change sooner if dressing saturated or falling off. DO NOT REMOVE BANDAGE TO CHECK WOUND ON INTAKE.  4. SHOWER: Okay to shower. Do not soak, submerge or let shower stream beat on dressing/wound.  5. STAPLES: RN Remove Staples POD14 (3/28/23).  6. DVT PE PROPHYLAXIS: Managed by Anticoag Team. See Med Rec.  7. GI: Continue Protonix daily while on Anticoagulant. an eRx has been sent to your pharmacy.  8. LABS:  INR if on Coumadin.  9. FOLLOW UP: Dr. Golden in 1 month. Call to schedule.  10. MEDICATIONS:  If going home, eRX sent to your pharmacy for .   11.**Call office if medications not covered under your insurance, especially BLOOD CLOT PREVENTION/anticoagulant medication.

## 2023-03-14 NOTE — PHYSICAL THERAPY INITIAL EVALUATION ADULT - ACTIVE RANGE OF MOTION EXAMINATION, REHAB EVAL
AAROM 0-100 with mild incisional pain reported with endrange FLEX, bending limited in part by compression bandaging/pain/jay. upper extremity Active ROM was WNL (within normal limits)/RLE Active ROM was WNL (within normal limits)/Left LE Active ROM was WFL (within functional limits)

## 2023-03-14 NOTE — PHYSICAL THERAPY INITIAL EVALUATION ADULT - GAIT DEVIATIONS NOTED, PT EVAL
holding knees stiff with heavy cueing to prevent buckling/increased time in double stance/decreased step length/decreased stride length

## 2023-03-14 NOTE — DISCHARGE NOTE PROVIDER - NSFTFHOMEHTHYNRD_GEN_ALL_CORE
Refill request from pharmacy.  Refilled per protocol.  Patient to have Mg level done at NOV scheduled for [facility] on 12/14/18.    Yes

## 2023-03-14 NOTE — PHYSICAL THERAPY INITIAL EVALUATION ADULT - MODALITIES TREATMENT COMMENTS
Pain anterior L knee /incisional rated 2-3/10 with FLEX ROM, decreased to 1/10 at rest after encounter

## 2023-03-14 NOTE — PHYSICAL THERAPY INITIAL EVALUATION ADULT - PLANNED THERAPY INTERVENTIONS, PT EVAL
stair training, car transfers , pain/edema relieving modalities L TKR/bed mobility training/gait training/ROM/strengthening/stretching/transfer training

## 2023-03-14 NOTE — DISCHARGE NOTE PROVIDER - NSDCMRMEDTOKEN_GEN_ALL_CORE_FT
Multiple Vitamins oral tablet: 1 tab(s) orally once a day  simvastatin 20 mg oral tablet: 1 tab(s) orally once a day (at bedtime)  Vitamin B-100 oral tablet: 1 tab(s) orally once a day  Vitamin C 1000 mg oral tablet: 1 tab(s) orally once a day   MiraLax oral powder for reconstitution: 17 gram(s) orally once a day  as needed for constipation  Multiple Vitamins oral tablet: 1 tab(s) orally once a day  oxyCODONE 5 mg oral tablet: 1 tab(s) orally every 4 hours as needed for severe pain; MDD:6  pantoprazole 40 mg oral delayed release tablet: 1 tab(s) orally once a day   senna oral tablet: 2 tab(s) orally once a day (at bedtime)   simvastatin 20 mg oral tablet: 1 tab(s) orally once a day (at bedtime)  Tylenol Extra Strength 500 mg oral tablet: 2 tab(s) orally 3 times a day   Vitamin B-100 oral tablet: 1 tab(s) orally once a day  Vitamin C 1000 mg oral tablet: 1 tab(s) orally once a day

## 2023-03-14 NOTE — DISCHARGE NOTE PROVIDER - HOSPITAL COURSE
H&P:  Pt is a 57y Male   PAST MEDICAL & SURGICAL HISTORY:  URMILA on CPAP      [place] syndrome      High cholesterol      Left knee pain      H/O arthroscopy of left knee           Now s/p Left Total Knee Arthroplasty. Pt is afebrile with stable vital signs. Pain is controlled. Alert and Oriented. Exam reveals intact EHL FHL TA GS, +DP. Dressing is clean and dry.    Hospital Course:  Patient presented to [facility] medically cleared for elective Left Total Knee Replacement Surgery, having failed outpatient conservative management. Prophylactic antibiotics were started before the procedure and continued for 24 hours. They were admitted after surgery to the orthopedic floor. There were no complications during the hospital stay. All home medications were continued.     **Pt received **U PRBC post op for Acute Blood Loss Anemia.    Routine consults were obtained from the Anticoagulation Team for DVT/PE prophylaxis, from Physical Therapy for twice daily PT, and from the Hospitalist for Medical Co-management. Patient was placed on anticoagulation. Pertinent home medications were continued. Daily labs were followed.      On POD 0 pt was stable overnight. No major events post-op. Pt received twice daily PT. The plan is for DC to home with home PT** or to Rehab for ongoing PT**. The orthopedic Attending is aware and agrees.      **POD1 DC DOCUMENTAION** (Keep or Delete depending on scenario)  The patient had no post-operative complications and clinically progressed faster than anticipated. The following condition(s) were actively treated during the hospital stay:  HLD  Obstructive Sleep Apnea  Hx [place] Syndrome   The patient met criteria for discharge before the 2nd night of the stay. The patient was appropriately and safely discharged home with home PT.    ******INCOMPLETE NOTE IN PREPARATION FOR DISPO PLANNING*******  ******INCOMPLETE NOTE IN PREPARATION FOR DISPO PLANNING*******  ******INCOMPLETE NOTE IN PREPARATION FOR DISPO PLANNING*******   H&P:  Pt is a 57y Male   PAST MEDICAL & SURGICAL HISTORY:  URMILA on CPAP      [place] syndrome      High cholesterol      Left knee pain      H/O arthroscopy of left knee           Now s/p Left Total Knee Arthroplasty. Pt is afebrile with stable vital signs. Pain is controlled. Alert and Oriented. Exam reveals intact EHL FHL TA GS, +DP. Dressing is clean and dry.    Hospital Course:  Patient presented to [facility] medically cleared for elective Left Total Knee Replacement Surgery, having failed outpatient conservative management. Prophylactic antibiotics were started before the procedure and continued for 24 hours. They were admitted after surgery to the orthopedic floor. There were no complications during the hospital stay. All home medications were continued.     Routine consults were obtained from the Anticoagulation Team for DVT/PE prophylaxis, from Physical Therapy for twice daily PT, and from the Hospitalist for Medical Co-management. Patient was placed on anticoagulation. Pertinent home medications were continued. Daily labs were followed.      On POD 0 pt was stable overnight. No major events post-op. Pt received twice daily PT. The plan is for DC to home with home PT. The orthopedic Attending is aware and agrees.      The patient had no post-operative complications and clinically progressed faster than anticipated. The following condition(s) were actively treated during the hospital stay:  HLD  Obstructive Sleep Apnea  Hx [place] Syndrome   The patient met criteria for discharge before the 2nd night of the stay. The patient was appropriately and safely discharged home with home PT.

## 2023-03-14 NOTE — PHYSICAL THERAPY INITIAL EVALUATION ADULT - LEVEL OF INDEPENDENCE: SIT/STAND, REHAB EVAL
CCGx1, a bit wobbly coming to erect standing and c/o weakness /mild giving way B knees ,pt is tall at 6'3" and 280lbs

## 2023-03-14 NOTE — PHYSICAL THERAPY INITIAL EVALUATION ADULT - MANUAL MUSCLE TESTING RESULTS, REHAB EVAL
BUE=5/5, RLE >/= 4/5 thruout , LLE 3+to -/5 proximally and 4/5 distally , able to SLR to >60 degrees,mild extensor lag, able to QS bilat, with good contractility

## 2023-03-14 NOTE — PHYSICAL THERAPY INITIAL EVALUATION ADULT - PRECAUTIONS/LIMITATIONS, REHAB EVAL
NO PILLOW UNDER L KNEE/ANKLE ONLY; FOB LOCKED TO 0 DEGREES; URMILA on CPAP at night/oxygen therapy device and L/min

## 2023-03-14 NOTE — PHYSICAL THERAPY INITIAL EVALUATION ADULT - GENERAL OBSERVATIONS, REHAB EVAL
reclined in PACU stretcher with B Flowtrons, LLE bandaged with elastic bandages foot to midthigh ,c/o min incisional pain at superior end ,no staining/strike-thru ,awake,alert, Ox4, reports 18 year h/o R knee pain ,worse last few months reclined in PACU stretcher with B Flowtrons, LLE bandaged with elastic bandages foot to midthigh ,ice bag to incision lengthwise , c/o min incisional pain at superior end 1-2/10 ,no staining/strike-thru ,awake,alert, Ox4, reports 18 year h/o R knee pain ,worse last few months

## 2023-03-14 NOTE — ASU PREOP CHECKLIST - SELECT TESTS ORDERED
BMP/CBC/Type and Screen/EKG/CXR/POCT Blood Glucose/COVID-19 BMP/CBC/PT/PTT/INR/Type and Screen/EKG/CXR/POCT Blood Glucose/COVID-19

## 2023-03-14 NOTE — PHYSICAL THERAPY INITIAL EVALUATION ADULT - GROSSLY INTACT, SENSORY
on initial exam 1720pm still with numbness B feet/toes ,after >1 hour able to feel B feet/Grossly Intact

## 2023-03-14 NOTE — DISCHARGE NOTE PROVIDER - NSDCFUSCHEDAPPT_GEN_ALL_CORE_FT
[facility]  [address] [facility]  Scheduled Appointment: 04/19/2023

## 2023-03-15 ENCOUNTER — TRANSCRIPTION ENCOUNTER (OUTPATIENT)
Age: 58
End: 2023-03-15

## 2023-03-15 VITALS
SYSTOLIC BLOOD PRESSURE: 137 MMHG | DIASTOLIC BLOOD PRESSURE: 75 MMHG | OXYGEN SATURATION: 96 % | HEART RATE: 73 BPM | TEMPERATURE: 98 F | RESPIRATION RATE: 17 BRPM

## 2023-03-15 LAB
ANION GAP SERPL CALC-SCNC: 5 MMOL/L — SIGNIFICANT CHANGE UP (ref 5–17)
BILIRUB SERPL-MCNC: 1.7 MG/DL — HIGH (ref 0.2–1.2)
BUN SERPL-MCNC: 26 MG/DL — HIGH (ref 7–23)
CALCIUM SERPL-MCNC: 8.9 MG/DL — SIGNIFICANT CHANGE UP (ref 8.5–10.1)
CHLORIDE SERPL-SCNC: 108 MMOL/L — SIGNIFICANT CHANGE UP (ref 96–108)
CO2 SERPL-SCNC: 24 MMOL/L — SIGNIFICANT CHANGE UP (ref 22–31)
CREAT SERPL-MCNC: 1.04 MG/DL — SIGNIFICANT CHANGE UP (ref 0.5–1.3)
EGFR: 83 ML/MIN/1.73M2 — SIGNIFICANT CHANGE UP
GLUCOSE SERPL-MCNC: 139 MG/DL — HIGH (ref 70–99)
HCT VFR BLD CALC: 40.7 % — SIGNIFICANT CHANGE UP (ref 39–50)
HGB BLD-MCNC: 14.3 G/DL — SIGNIFICANT CHANGE UP (ref 13–17)
INR BLD: 1.14 RATIO — SIGNIFICANT CHANGE UP (ref 0.88–1.16)
MCHC RBC-ENTMCNC: 32.5 PG — SIGNIFICANT CHANGE UP (ref 27–34)
MCHC RBC-ENTMCNC: 35.1 GM/DL — SIGNIFICANT CHANGE UP (ref 32–36)
MCV RBC AUTO: 92.5 FL — SIGNIFICANT CHANGE UP (ref 80–100)
MELD SCORE WITH DIALYSIS: 23 POINTS — SIGNIFICANT CHANGE UP
MELD SCORE WITHOUT DIALYSIS: 10 POINTS — SIGNIFICANT CHANGE UP
PLATELET # BLD AUTO: 212 K/UL — SIGNIFICANT CHANGE UP (ref 150–400)
POTASSIUM SERPL-MCNC: 4.1 MMOL/L — SIGNIFICANT CHANGE UP (ref 3.5–5.3)
POTASSIUM SERPL-SCNC: 4.1 MMOL/L — SIGNIFICANT CHANGE UP (ref 3.5–5.3)
PROTHROM AB SERPL-ACNC: 13.2 SEC — SIGNIFICANT CHANGE UP (ref 10.5–13.4)
RBC # BLD: 4.4 M/UL — SIGNIFICANT CHANGE UP (ref 4.2–5.8)
RBC # FLD: 12.4 % — SIGNIFICANT CHANGE UP (ref 10.3–14.5)
SODIUM SERPL-SCNC: 137 MMOL/L — SIGNIFICANT CHANGE UP (ref 135–145)
WBC # BLD: 16.83 K/UL — HIGH (ref 3.8–10.5)
WBC # FLD AUTO: 16.83 K/UL — HIGH (ref 3.8–10.5)

## 2023-03-15 PROCEDURE — 99254 IP/OBS CNSLTJ NEW/EST MOD 60: CPT

## 2023-03-15 PROCEDURE — 99252 IP/OBS CONSLTJ NEW/EST SF 35: CPT

## 2023-03-15 RX ORDER — ASPIRIN/CALCIUM CARB/MAGNESIUM 324 MG
1 TABLET ORAL
Qty: 60 | Refills: 0
Start: 2023-03-15 | End: 2023-04-13

## 2023-03-15 RX ORDER — ASPIRIN/CALCIUM CARB/MAGNESIUM 324 MG
81 TABLET ORAL EVERY 12 HOURS
Refills: 0 | Status: DISCONTINUED | OUTPATIENT
Start: 2023-03-16 | End: 2023-03-15

## 2023-03-15 RX ADMIN — ENOXAPARIN SODIUM 40 MILLIGRAM(S): 100 INJECTION SUBCUTANEOUS at 06:12

## 2023-03-15 RX ADMIN — Medication 100 MILLIGRAM(S): at 06:11

## 2023-03-15 RX ADMIN — OXYCODONE HYDROCHLORIDE 10 MILLIGRAM(S): 5 TABLET ORAL at 14:32

## 2023-03-15 RX ADMIN — Medication 975 MILLIGRAM(S): at 13:32

## 2023-03-15 RX ADMIN — Medication 1 TABLET(S): at 11:49

## 2023-03-15 RX ADMIN — CELECOXIB 200 MILLIGRAM(S): 200 CAPSULE ORAL at 11:48

## 2023-03-15 RX ADMIN — Medication 975 MILLIGRAM(S): at 06:12

## 2023-03-15 RX ADMIN — PANTOPRAZOLE SODIUM 40 MILLIGRAM(S): 20 TABLET, DELAYED RELEASE ORAL at 06:12

## 2023-03-15 RX ADMIN — OXYCODONE HYDROCHLORIDE 10 MILLIGRAM(S): 5 TABLET ORAL at 13:32

## 2023-03-15 RX ADMIN — Medication 101.6 MILLIGRAM(S): at 06:11

## 2023-03-15 RX ADMIN — Medication 975 MILLIGRAM(S): at 14:32

## 2023-03-15 NOTE — DISCHARGE NOTE NURSING/CASE MANAGEMENT/SOCIAL WORK - PATIENT PORTAL LINK FT
You can access the FollowMyHealth Patient Portal offered by [facility] by registering at the following website: [email or web address]. By joining [employer]’s FollowMyHealth portal, you will also be able to view your health information using other applications (apps) compatible with our system.

## 2023-03-15 NOTE — CONSULT NOTE ADULT - SUBJECTIVE AND OBJECTIVE BOX
SUBJECTIVE:    CHIEF COMPLAINT:  Patient is a 57y old  Male who presents with a chief complaint of Left Total Knee Arthoplasty (14 Mar 2023 17:38)      HPI  Patient is post op s/p L TKA. Doing well. Pain controlled    Active Problems  Encounter for preventive health examination (V70.0) (Z00.00)  Abdominal pain (789.00) (R10.9)  Chest tightness or pressure (786.59) (R07.89)  Deviated nasal septum (470) (J34.2)  Diabetes mellitus (250.00) (E11.9)  Difficulty breathing (786.09) (R06.89)  Fatty liver, alcoholic (571.0) (K70.0)  Fever (780.60) (R50.9)  Hyperlipidemia (272.4) (E78.5)  Internal derangement of knee (717.9) (M23.90)  Nasal obstruction (478.19) (J34.89)  Nonalcoholic fatty liver disease (571.8) (K76.0)  Obesity (278.00) (E66.9)  Obstructive sleep apnea, adult (327.23) (G47.33)  Post-traumatic osteoarthritis of left knee (715.26) (M17.32)  Sleep disturbances (780.50) (G47.9)  Vitamin D deficiency (268.9) (E55.9)    Past Medical History  History of Benign Polyps Of The Large Intestine (V12.72)  History of Fatty liver (571.8) (K76.0)  History of chest pain (V13.89) (Z87.898)  History of colonic polyps (V12.72) (Z86.010)  History of diabetes mellitus (V12.29) (Z86.39)  History of hyperlipidemia (V12.29) (Z86.39)  History of splenomegaly (V12.3) (Z87.898)  History of Liver enlargement (789.1) (R16.0)  History of Vitamin D deficiency (268.9) (E55.9)    Surgical History  History of Complete Colonoscopy For Polyp Removal  History of Knee Arthroscopy (Therapeutic)  History of Knee Surgery  · status post falling at work with rupture of patellar tendon and followup surgical repair of  the left patellar tendon on January 23, 12.  History of Surgery Spermatic Cord For Varicocele    Current Meds  Simvastatin 10 MG Oral Tablet; TAKE 1 TABLET DAILY AT BEDTIME  Vitamin D3 25 MCG (1000 UT) Oral Tablet; TAKE 1 TABLET DAILY    Allergies  No Known Drug Allergies    MEDICATIONS:  MEDICATIONS  (STANDING):  acetaminophen     Tablet .. 975 milliGRAM(s) Oral every 8 hours  celecoxib 200 milliGRAM(s) Oral every 12 hours  enoxaparin Injectable 40 milliGRAM(s) SubCutaneous every 24 hours  influenza   Vaccine 0.5 milliLiter(s) IntraMuscular once  lactated ringers. 1000 milliLiter(s) (100 mL/Hr) IV Continuous <Continuous>  multivitamin 1 Tablet(s) Oral daily  pantoprazole    Tablet 40 milliGRAM(s) Oral before breakfast  polyethylene glycol 3350 17 Gram(s) Oral at bedtime  senna 2 Tablet(s) Oral at bedtime  simvastatin 20 milliGRAM(s) Oral at bedtime      REVIEW OF SYSTEMS:  CONSTITUTIONAL: No weakness, fevers or chills  EYES/ENT: No visual changes;  No vertigo or throat pain   NECK: No pain or stiffness  RESPIRATORY: No cough, wheezing, hemoptysis; No shortness of breath  CARDIOVASCULAR: No chest pain or palpitations  GASTROINTESTINAL: No abdominal or epigastric pain. No nausea, vomiting, or hematemesis; No diarrhea or constipation. No melena or hematochezia.  GENITOURINARY: No dysuria, frequency or hematuria  NEUROLOGICAL: No numbness or weakness  SKIN: No itching, burning, rashes, or lesions   All other review of systems is negative unless indicated above  OBJECTIVE:    PHYSICAL EXAM:  Vital Signs Last 24 Hrs  T(C): 37.1 (15 Mar 2023 08:00), Max: 37.1 (15 Mar 2023 08:00)  T(F): 98.7 (15 Mar 2023 08:00), Max: 98.7 (15 Mar 2023 08:00)  HR: 71 (15 Mar 2023 08:00) (63 - 90)  BP: 122/73 (15 Mar 2023 08:00) (99/65 - 148/90)  BP(mean): --  RR: 17 (15 Mar 2023 08:00) (11 - 22)  SpO2: 96% (15 Mar 2023 08:00) (93% - 100%)    Parameters below as of 15 Mar 2023 08:00  Patient On (Oxygen Delivery Method): room air      Constitutional: NAD, awake and alert, well-developed  HEENT: PERRLA, EOMI, Pharynx Clear  Neck: Supple, No JVD, No Lymphadenopathy  Respiratory: Breath sounds are clear bilaterally, No wheezing, rales or rhonchi  Cardiovascular: S1 and S2, regular rate and rhythm, no Murmurs, rubs or gallops  Gastrointestinal: Bowel Sounds present, soft, nontender. No Hepatosplenomegally. No masses  Extremities: Without clubbing, cyanosis or edema. Ace wrap in place  Vascular: 2+ peripheral pulses  Neurological: A/O x 3, no focal deficits  Musculoskeletal: FROM upper extremities  Skin: No rashes    LABS:                         14.3   16.83 )-----------( 212      ( 15 Mar 2023 07:45 )             40.7     03-15    137  |  108  |  26<H>  ----------------------------<  139<H>  4.1   |  24  |  1.04    Ca    8.9      15 Mar 2023 07:45    TPro  x   /  Alb  x   /  TBili  1.7<H>  /  DBili  x   /  AST  x   /  ALT  x   /  AlkPhos  x   03-15    PT/INR - ( 15 Mar 2023 07:45 )   PT: 13.2 sec;   INR: 1.14 ratio      CAPILLARY BLOOD GLUCOSE  POCT Blood Glucose.: 175 mg/dL (14 Mar 2023 21:32)  POCT Blood Glucose.: 102 mg/dL (14 Mar 2023 16:02)  POCT Blood Glucose.: 102 mg/dL (14 Mar 2023 10:51)    
  HPI:      Patient is a 57y old  Male who presents with a chief complaint of Left knee pain now s/p Left  Total Knee Arthoplasty (14 Mar 2023 17:38)Consulted by Dr. Ron Golden   for VTE prophylaxis, risk stratification, and anticoagulation management.    PAST MEDICAL & SURGICAL HISTORY:  URMILA on CPAP      Jay syndrome      High cholesterol      Left knee pain      Fatty liver      Colon polyps      H/O arthroscopy of left knee  twice left 1 right      History of repair of ACL  left    3/15/2023: Patient seen at bedside , discussed the necessity of DVT prophylaxis post procedure, patient denies any h/o vte, stroke, or cancer , advised patient to take ASA 81mg BID with food for 4 weeks , advised patient to refrain from NSAIDs while on ASA , patient in agreement with the plan           CrCl:137.5  BMI:33.5  EBL:250ml    Caprini VTE Risk Score:  AGE RELATED RISK FACTORS                                                       MOBILITY RELATED FACTORS  [x ] Age 41-60 years                                            (1 Point)                  [ ] Bed rest /restricted mobility                             (1 Point)  [ ] Age: 61-74 years                                           (2 Points)                [ ] Plaster cast                                                   (2 Points)  [ ] Age= 75 years                                              (3 Points)                 [ ] Bed bound for more than 72 hours                   (2 Points)    DISEASE RELATED RISK FACTORS                                               GENDER SPECIFIC FACTORS  [ ] Edema in the lower extremities                       (1 Point)           [ ] Pregnancy                                                            (1 Point)  [ ] Varicose veins                                               (1 Point)                  [ ] Post-partum < 6 weeks                                      (1 Point)             [x ] BMI > 25 Kg/m2                                            (1 Point)                  [ ] Hormonal therapy or oral contraception       (1 Point)                 [ ] Sepsis (in the previous month)                        (1 Point)             [ ] History of pregnancy complications                (1Point)  [ ] Pneumonia or serious lung disease                                             [ ] Unexplained or recurrent  (=/>3), premature                                 (In the previous month)                               (1 Point)                birth with toxemia or growth-restricted infant (1 Point)  [ ] Abnormal pulmonary function test            (1 Point)                                   SURGERY RELATED RISK FACTORS  [ ] Acute myocardial infarction                       (1 Point)                  [ ]  Section                                         (1 Point)  [ ] Congestive heart failure (in the previous month) (1 Point)   [ ] Minor surgery   lasting <45 minutes       (1 Point)   [ ] Inflammatory bowel disease                             (1 Point)          [ ] Arthroscopic surgery                                  (2 Points)  [ ] Central venous access                                    (2 Points)            [ ] General surgery lasting >45 minutes      (2 Points)       [ ] Stroke (in the previous month)                  (5 Points)            [x ] Elective major lower extremity arthroplasty (5 Points)                                   [  ] Malignancy (present or past include skin melanoma                                          but exclude  basal skin cell)    (2 points)                                      TRAUMA RELATED RISK FACTORS                HEMATOLOGY RELATED FACTORS                                  [ ] Fracture of the hip, pelvis, or leg                       (5 Points)  [ ] Prior episodes of VTE                                     (3 Points)          [ ] Acute spinal cord injury (in the previous month)  (5 Points)  [ ] Positive family history for VTE                         (3 Points)       [ ] Paralysis (less than 1 month)                          (5 Points)  [ ] Prothrombin 45329 A                                      (3 Points)         [ ] Multiple Trauma (within 1month)                 (5Points)                                                                                                                                                                [ ] Factor V Leiden                                          (3 Points)                                OTHER RISK FACTORS                          [ ] Lupus anticoagulants                                     (3 Points)                       [ ] BMI > 40                          (1 Point)                                                         [ ] Anticardiolipin antibodies                                (3 Points)                   [ ] Smoking                              (1Point)                                                [ ] High homocysteine in the blood                      (3 Points)                [  ] Diabetes requiring insulin (1point)                         [ ] Other congenital or acquired thrombophilia       (3 Points)          [  ] Chemotherapy                   (1 Point)  [ ] Heparin induced thrombocytopenia                  (3 Points)             [  ] Blood Transfusion                (1 point)                                                                                                             Total Score [    7      ]                                                                                                                                                                                                                                                                                                                                                                                                                                             IMPROVE Bleeding Risk Score:2.5      Falls Risk:   High (  )  Mod ( x )  Low (  )      FAMILY HISTORY:  Family history of cancer (Father)    Diabetes mellitus, type 2 (Mother)      Denies any personal or familial history of clotting or bleeding disorders.    Allergies    No Known Allergies    Intolerances        REVIEW OF SYSTEMS    (  )Fever	     (  )Constipation	(  )SOB				(  )Headache	(  )Dysuria  (  )Chills	     (  )Melena	(  )Dyspnea present on exertion	                    (  )Dizziness                    (  )Polyuria  (  )Nausea	     (  )Hematochezia	(  )Cough			                    (  )Syncope   	(  )Hematuria  (  )Vomiting    (  )Chest Pain	(  )Wheezing			(  )Weakness  (  )Diarrhea     (  )Palpitations	(  )Anorexia			( x )joint pain    All  other review of systems negative: Yes    Vital Signs Last 24 Hrs  T(C): 37.1 (15 Mar 2023 08:00), Max: 37.1 (15 Mar 2023 08:00)  T(F): 98.7 (15 Mar 2023 08:00), Max: 98.7 (15 Mar 2023 08:00)  HR: 71 (15 Mar 2023 08:00) (63 - 90)  BP: 122/73 (15 Mar 2023 08:00) (99/65 - 148/90)  BP(mean): --  RR: 17 (15 Mar 2023 08:00) (11 - 22)  SpO2: 96% (15 Mar 2023 08:00) (93% - 100%)    PHYSICAL EXAM:    Constitutional: Appears Well    Neurological: A& O x 3    Skin: Warm    Respiratory and Thorax: normal effort; Breath sounds: normal; No rales/wheezing/rhonchi  	  Cardiovascular: S1, S2, regular, NMBR	    Gastrointestinal: BS + x 4Q, nontender	    Genitourinary:  Bladder nondistended, nontender    Musculoskeletal:   General Right:   no muscle/joint tenderness,   normal tone, no joint swelling,   ROM: full	    General Left:   + muscle/joint tenderness,   normal tone, no joint swelling,   ROM: limited      Knee:  Left: Incision: ; Dressing CDI;       Lower extrems:   Right: no calf tenderness              negative radha's sign               + pedal pulses    Left:   no calf tenderness              negative radha's sign               + pedal pulses                          14.3   16.83 )-----------( 212      ( 15 Mar 2023 07:45 )             40.7       03-15    137  |  108  |  26<H>  ----------------------------<  139<H>  4.1   |  24  |  1.04    Ca    8.9      15 Mar 2023 07:45    TPro  x   /  Alb  x   /  TBili  1.7<H>  /  DBili  x   /  AST  x   /  ALT  x   /  AlkPhos  x   03-15      PT/INR - ( 15 Mar 2023 07:45 )   PT: 13.2 sec;   INR: 1.14 ratio         				  MEDICATIONS  (STANDING):  acetaminophen     Tablet .. 975 milliGRAM(s) Oral every 8 hours  celecoxib 200 milliGRAM(s) Oral every 12 hours  influenza   Vaccine 0.5 milliLiter(s) IntraMuscular once  lactated ringers. 1000 milliLiter(s) (100 mL/Hr) IV Continuous <Continuous>  multivitamin 1 Tablet(s) Oral daily  pantoprazole    Tablet 40 milliGRAM(s) Oral before breakfast  polyethylene glycol 3350 17 Gram(s) Oral at bedtime  senna 2 Tablet(s) Oral at bedtime  simvastatin 20 milliGRAM(s) Oral at bedtime        DVT Prophylaxis:  LMWH                   (  )  Heparin SQ           (  )  Coumadin             (  )  Xarelto                  (  )  Eliquis                   (  )  Venodynes           ( x )  Ambulation          ( x )  UFH                       (  )  Contraindicated  (  )  EC ASPIRIN       (  )

## 2023-03-15 NOTE — CONSULT NOTE ADULT - ASSESSMENT
58 YO  Male presents for TKA    Assessment:  POD #1 S/p Left TKA  Hyperlipidemia    Plan:  Continue care per ortho  Continue Simvastatin  Heparin SQ for DVT prophylaxis  Ambulate with PT as tolerated  Disposition home pending progress with PT  will follow with you. Thank you.

## 2023-03-15 NOTE — PROGRESS NOTE ADULT - SUBJECTIVE AND OBJECTIVE BOX
08-May-2017 13:45
Patient seen and examined at bedside. Pain is controlled. Patient denies any numbness, tingling, weakness, or any other orthopaedic complaint.    Exam:  Vital Signs Last 24 Hrs  T(C): 36.5 (15 Mar 2023 04:08), Max: 36.7 (14 Mar 2023 18:28)  T(F): 97.7 (15 Mar 2023 04:08), Max: 98 (14 Mar 2023 18:28)  HR: 90 (15 Mar 2023 04:08) (63 - 90)  BP: 102/62 (15 Mar 2023 04:08) (99/65 - 148/90)  BP(mean): --  RR: 17 (15 Mar 2023 04:08) (11 - 22)  SpO2: 95% (15 Mar 2023 04:08) (93% - 100%)    Parameters below as of 15 Mar 2023 04:08  Patient On (Oxygen Delivery Method): room air    Gen: Resting in bed, no acute distress  LLE  Dressing in place, clean/dry/intact.   Ching-incisional tenderness to palpation; otherwise, NTTP throughout the rest of the extremity.   SILT L2-S1.  GSC/TA/EHL/FHL intact. Q/H unable to assess 2' pain.   DP pulse palpable.   No calf tenderness bilaterally.   Compartments soft and compressible.                           15.6   10.12 )-----------( 214      ( 14 Mar 2023 16:15 )             43.9   03-14    142  |  111<H>  |  24<H>  ----------------------------<  114<H>  4.1   |  26  |  1.00    Ca    8.8      14 Mar 2023 16:15        Assessment and Plan:  57y Male POD1 L TKA    Follow up AM labs  WBAT/PT/OT  Pain management PRN  Continue prophylactic antibiotics  DVT PPx: appreciate anticoagulation team recommendations.   Incentive spirometry  Dispo: recommendations per PT  Will discuss with Dr. Golden and advise of any changes to the plan. 
Orthopedics Post-op Check:    This is a 58y/o Male s/p Left Total Knee Arthroplasty POD 0.  Pain is controlled. Pt feeling well. No nausea or vomiting.      Labs:                        15.6   10.12 )-----------( 214      ( 14 Mar 2023 16:15 )             43.9     03-14    142  |  111<H>  |  24<H>  ----------------------------<  114<H>  4.1   |  26  |  1.00    Ca    8.8      14 Mar 2023 16:15          Vital Signs Last 24 Hrs  T(C): 36.5 (03-14-23 @ 15:57), Max: 36.5 (03-14-23 @ 15:57)  T(F): 97.7 (03-14-23 @ 15:57), Max: 97.7 (03-14-23 @ 15:57)  HR: 69 (03-14-23 @ 17:30) (63 - 70)  BP: 116/69 (03-14-23 @ 17:30) (106/63 - 148/90)  BP(mean): --  RR: 14 (03-14-23 @ 17:30) (11 - 22)  SpO2: 96% (03-14-23 @ 17:30) (96% - 100%)    Physical Exam:  General:  NAD AAOx3.  Musculoskeletal:  Left Knee:  Dressing clean, dry and intact.  SCDs in place.  Calves are soft and nontender.  Moving all toes and ankle, +EHL/FHL/TA/GS.  SILT throughout.  DP and PT pulses 2+.    A/P:  Stable POD 0 Left Total Knee Arthroplasty  -Analgesia  -LLE elevation  -Ice application  -Prophylactic antibiotics  -DVT PE prophylaxis  -SCDs  -Incentive spirometry  -OOB PT WBAT LLE

## 2023-03-15 NOTE — CONSULT NOTE ADULT - ASSESSMENT
Patient is a 57y old  Male who presents with a chief complaint of Left knee pain now s/p Left  Total Knee Arthoplasty (14 Mar 2023 17:38)Consulted by Dr. Ron Golden   for VTE prophylaxis, risk stratification, and anticoagulation management. patient is high risk for VTE and low bleeding risk.    Discussed the risks vs benefits of  aspirin therapy with patient. Agrees with treatment and understands the necessity of therapy.    Plan:    Enteric coated ASA 81mg PO BID x 4 weeks last dose 4/12/2023    Protonix 40mg PO daily while on Aspirin    Daily CBC/BMP    Venodynes    Enc ambulation    Thank you for this consult will sign off please reconsult if needed

## 2023-03-16 ENCOUNTER — NON-APPOINTMENT (OUTPATIENT)
Age: 58
End: 2023-03-16

## 2023-03-16 ENCOUNTER — TRANSCRIPTION ENCOUNTER (OUTPATIENT)
Age: 58
End: 2023-03-16

## 2023-03-16 LAB — SURGICAL PATHOLOGY STUDY: SIGNIFICANT CHANGE UP

## 2023-03-19 ENCOUNTER — TRANSCRIPTION ENCOUNTER (OUTPATIENT)
Age: 58
End: 2023-03-19

## 2023-03-19 DIAGNOSIS — E66.9 OBESITY, UNSPECIFIED: ICD-10-CM

## 2023-03-19 DIAGNOSIS — E80.4 GILBERT SYNDROME: ICD-10-CM

## 2023-03-19 DIAGNOSIS — E78.00 PURE HYPERCHOLESTEROLEMIA, UNSPECIFIED: ICD-10-CM

## 2023-03-19 DIAGNOSIS — M17.32 UNILATERAL POST-TRAUMATIC OSTEOARTHRITIS, LEFT KNEE: ICD-10-CM

## 2023-03-19 DIAGNOSIS — E55.9 VITAMIN D DEFICIENCY, UNSPECIFIED: ICD-10-CM

## 2023-03-19 DIAGNOSIS — G47.33 OBSTRUCTIVE SLEEP APNEA (ADULT) (PEDIATRIC): ICD-10-CM

## 2023-03-19 DIAGNOSIS — E11.9 TYPE 2 DIABETES MELLITUS WITHOUT COMPLICATIONS: ICD-10-CM

## 2023-03-19 DIAGNOSIS — Z79.899 OTHER LONG TERM (CURRENT) DRUG THERAPY: ICD-10-CM

## 2023-03-19 DIAGNOSIS — Z99.89 DEPENDENCE ON OTHER ENABLING MACHINES AND DEVICES: ICD-10-CM

## 2023-03-21 ENCOUNTER — TRANSCRIPTION ENCOUNTER (OUTPATIENT)
Age: 58
End: 2023-03-21

## 2023-03-24 ENCOUNTER — TRANSCRIPTION ENCOUNTER (OUTPATIENT)
Age: 58
End: 2023-03-24

## 2023-03-28 ENCOUNTER — TRANSCRIPTION ENCOUNTER (OUTPATIENT)
Age: 58
End: 2023-03-28

## 2023-04-12 DIAGNOSIS — Z87.898 PERSONAL HISTORY OF OTHER SPECIFIED CONDITIONS: ICD-10-CM

## 2023-04-12 DIAGNOSIS — R07.89 OTHER CHEST PAIN: ICD-10-CM

## 2023-04-12 DIAGNOSIS — K76.0 FATTY (CHANGE OF) LIVER, NOT ELSEWHERE CLASSIFIED: ICD-10-CM

## 2023-04-12 DIAGNOSIS — Z01.818 ENCOUNTER FOR OTHER PREPROCEDURAL EXAMINATION: ICD-10-CM

## 2023-04-12 DIAGNOSIS — R06.89 OTHER ABNORMALITIES OF BREATHING: ICD-10-CM

## 2023-04-14 ENCOUNTER — TRANSCRIPTION ENCOUNTER (OUTPATIENT)
Age: 58
End: 2023-04-14

## 2023-04-19 ENCOUNTER — APPOINTMENT (OUTPATIENT)
Dept: ORTHOPEDIC SURGERY | Facility: CLINIC | Age: 58
End: 2023-04-19
Payer: OTHER MISCELLANEOUS

## 2023-04-19 VITALS — WEIGHT: 275 LBS | HEIGHT: 75 IN | BODY MASS INDEX: 34.19 KG/M2

## 2023-04-19 PROBLEM — K63.5 POLYP OF COLON: Chronic | Status: ACTIVE | Noted: 2023-03-14

## 2023-04-19 PROBLEM — K76.0 FATTY (CHANGE OF) LIVER, NOT ELSEWHERE CLASSIFIED: Chronic | Status: ACTIVE | Noted: 2023-03-14

## 2023-04-19 PROBLEM — M25.562 PAIN IN LEFT KNEE: Chronic | Status: ACTIVE | Noted: 2023-03-01

## 2023-04-19 PROCEDURE — 73564 X-RAY EXAM KNEE 4 OR MORE: CPT | Mod: LT

## 2023-04-19 PROCEDURE — 99024 POSTOP FOLLOW-UP VISIT: CPT

## 2023-04-19 NOTE — HISTORY OF PRESENT ILLNESS
[3] : 3 [0] : 0 [Dull/Aching] : dull/aching [Stabbing] : stabbing [Intermittent] : intermittent [Walking] : walking [] : no [FreeTextEntry1] : Lt. Knee [de-identified] : 3/14/2023 [FreeTextEntry5] : 57 /yo M presents for PO #1 eval. of Lt. knee. Pt reports recovery is going well following TKA on DOS noted above. He notes of minimal pain and swelling. [de-identified] : Dr. Golden [de-identified] : 3/14/2023 [de-identified] : Lt. TKA [de-identified] : 3/14/2023

## 2023-04-19 NOTE — DATA REVIEWED
[FreeTextEntry1] : X-rays done in the office today of the left knee 4 views show left total knee prosthesis in normal alignment in good position with no signs of loosening or wear.  There are no obvious tumors masses or calcifications seen.

## 2023-04-19 NOTE — ASSESSMENT
[FreeTextEntry1] : Patient comes in today follow-up on his left knee.  He is now 1 month out from his left total knee replacement done on March 14, 2023.  He is doing physical therapy.  He is currently not working.  He still has pain but is slowly improving.

## 2023-04-19 NOTE — DISCUSSION/SUMMARY
[de-identified] : Plan at this time is continue physical therapy.  He is currently not working.  His left knee pain is causally related to his work accident.  He has 100% temporary partial disability to the left knee.  I will see him back in the office in 2 months.

## 2023-04-19 NOTE — IMAGING
[de-identified] : Examination left lower extremity was normal neurovascular exam.  Examination left knee reveals a well-healed midline scar.  Range of motion 0 to 115 degrees with mild pain at the extremes.  He has a trace effusion.  There is no instability or apprehension.  There is no redness rashes.  There is no signs of infection or DVT.

## 2023-04-20 NOTE — RESULTS/DATA
[] : results reviewed [de-identified] : WNL [de-identified] : WNL [de-identified] : WNL [de-identified] : WNL [de-identified] : NSR @65 bpm, no acute changes  [de-identified] : A1C 5.3\par Type A pos\par antibody screen neg\par MRSA neg\par Staph neg

## 2023-04-20 NOTE — PLAN
[FreeTextEntry1] : The patient has been advised to remain NPO after the midright prior to surgery.\par Risks and benefits of the surgery have been discussed by the operative physician.\par The patient has been advised to avoid taking aspirin and aspirin containing products from now until surgery.\par May require Bipap or CPAP post op after anaesthesia - 8 cm H2O pressure\par Thank you for including me in the care of your patient.\par

## 2023-04-20 NOTE — HISTORY OF PRESENT ILLNESS
[No Pertinent Cardiac History] : no history of aortic stenosis, atrial fibrillation, coronary artery disease, recent myocardial infarction, or implantable device/pacemaker [Sleep Apnea] : sleep apnea [No Adverse Anesthesia Reaction] : no adverse anesthesia reaction in self or family member [(Patient denies any chest pain, claudication, dyspnea on exertion, orthopnea, palpitations or syncope)] : Patient denies any chest pain, claudication, dyspnea on exertion, orthopnea, palpitations or syncope [Good (7-10 METs)] : Good (7-10 METs) [Anti-Platelet Agents: _____] : Anti-Platelet Agents: [unfilled] [NSAIDs: _____] : NSAIDs: [unfilled] [Herbal Supplements: _____] : Herbal Supplements: [unfilled] [Asthma] : no asthma [COPD] : no COPD [Smoker] : not a smoker [Chronic Anticoagulation] : no chronic anticoagulation [Chronic Kidney Disease] : no chronic kidney disease [Diabetes] : no diabetes [FreeTextEntry1] : Left Total Knee Replacement at [facility] [FreeTextEntry2] : 03/14/2023 [FreeTextEntry3] : Ron Golden MD [FreeTextEntry4] : New pt is here for medical clearance.

## 2023-04-20 NOTE — CONSULT LETTER
[Dear  ___] : Dear  [unfilled], [( Thank you for referring [unfilled] for consultation for _____ )] : Thank you for referring [unfilled] for consultation for [unfilled] [Please see my note below.] : Please see my note below. [Consult Closing:] : Thank you very much for allowing me to participate in the care of this patient.  If you have any questions, please do not hesitate to contact me. [Sincerely,] : Sincerely, [FreeTextEntry3] : Ron Lim MD, FAAFP\par Chair, Family Medicine, [facility]\par , Family Medicine, [facility], [facility]\par , Family Medicine, [employer]\par , Family and Community Medicine, [facility]\par \par \par

## 2023-05-12 ENCOUNTER — TRANSCRIPTION ENCOUNTER (OUTPATIENT)
Age: 58
End: 2023-05-12

## 2023-05-30 NOTE — H&P PST ADULT - ENMT COMMENTS
Nasal obstruction in supine position Calcipotriene Counseling:  I discussed with the patient the risks of calcipotriene including but not limited to erythema, scaling, itching, and irritation.

## 2023-06-02 NOTE — DISCHARGE NOTE PROVIDER - NSDCQMSTROKE_NEU_ALL_CORE
Michela Garcia discharged to home accompanied by family member. Patient provided with the following educational materials upon discharge: Valuables and belongings sent with patient, discharge summary, discharge instructions, medications and follow up appointments reviewed with patient and family member. Patient and family member verbalized understanding.   No

## 2023-06-08 ENCOUNTER — TRANSCRIPTION ENCOUNTER (OUTPATIENT)
Age: 58
End: 2023-06-08

## 2023-06-26 ENCOUNTER — APPOINTMENT (OUTPATIENT)
Dept: ORTHOPEDIC SURGERY | Facility: CLINIC | Age: 58
End: 2023-06-26
Payer: OTHER MISCELLANEOUS

## 2023-06-26 VITALS — BODY MASS INDEX: 34.19 KG/M2 | WEIGHT: 275 LBS | HEIGHT: 75 IN

## 2023-06-26 PROCEDURE — 99213 OFFICE O/P EST LOW 20 MIN: CPT

## 2023-06-27 NOTE — HISTORY OF PRESENT ILLNESS
[4] : 4 [0] : 0 [Dull/Aching] : dull/aching [Stabbing] : stabbing [Intermittent] : intermittent [Sitting] : sitting [Walking] : walking [Not working due to injury] : Work status: not working due to injury [] : no [FreeTextEntry1] : Lt. Knee [FreeTextEntry5] : 57 /yo M presents for PO #2 eval. of Lt. knee. Pt reports recovery is going well following TKA on DOS noted above. Pt reports of slightly increased pain levels since last visit with some swelling persisting.  [de-identified] : 4/19/23 [de-identified] : Dr. Golden [de-identified] : 3/14/2023 [de-identified] : 3/14/2023 [de-identified] : Lt. TKA

## 2023-06-27 NOTE — ASSESSMENT
[FreeTextEntry1] : The patient comes in today follow-up on his left knee.  Is now 3 and half months out from his left total knee replacement for posttraumatic arthritis done on March 14, 2023.  He was doing very well in the beginning with physical therapy but his physical therapy ran out and his pain is recurred.  He has difficulty walking and doing stairs.  He has occasional swelling.  He is worse by the end of the day.  He is currently not working.\par \par Examination of the left lower extremity reveals normal neurovascular exam.  Examination left knee reveals well-healed midline scar.  His range of motion is from 0 to 120 degrees.  There is 1+ effusion.  There is mild diffuse joint line pain.  There is no instability or apprehension.  There is no patella crepitation.\par \par Plan at this time is to get authorization for physical therapy 3 times a week for 8 weeks for the left knee.  He remains totally disabled at 100%.  He is currently not working.  His left knee pain is causally related to his work accident.  We will see him back in 3 months.

## 2023-09-27 ENCOUNTER — APPOINTMENT (OUTPATIENT)
Dept: ORTHOPEDIC SURGERY | Facility: CLINIC | Age: 58
End: 2023-09-27
Payer: OTHER MISCELLANEOUS

## 2023-09-27 VITALS — BODY MASS INDEX: 34.19 KG/M2 | HEIGHT: 75 IN | WEIGHT: 275 LBS

## 2023-09-27 PROCEDURE — 99213 OFFICE O/P EST LOW 20 MIN: CPT

## 2023-12-27 ENCOUNTER — APPOINTMENT (OUTPATIENT)
Dept: ORTHOPEDIC SURGERY | Facility: CLINIC | Age: 58
End: 2023-12-27
Payer: OTHER MISCELLANEOUS

## 2023-12-27 VITALS — WEIGHT: 280 LBS | HEIGHT: 75 IN | BODY MASS INDEX: 34.82 KG/M2

## 2023-12-27 PROCEDURE — 99213 OFFICE O/P EST LOW 20 MIN: CPT

## 2023-12-27 NOTE — ASSESSMENT
[FreeTextEntry1] : The patient is 9 months out from his left total knee replacement for posttraumatic arthritis done on March 14, 2023. He was doing very well in the beginning with physical therapy but his physical therapy ran out and his pain is recurred. He has difficulty walking and doing stairs. He has new superficial anterior knee pain with decending stairs that is mild overall. He has occasional swelling. He is worse by the end of the day. He is currently not working. She denies new trauma.  Left knee exam: Neurovascularly intact. Sensation intact. Examination left knee reveals well-healed midline scar. His range of motion is from 0 to 120 degrees. There is 1+ effusion. There is mild diffuse joint line pain. There is no instability or apprehension. There is no patella crepitation.  Plan at this time is to continue home exercises and activity modification. The patient will use voltaren gel for the anteiror knee pain as needed. He is unable to return to work at this time. He has 100% temporary disability to the left knee. His left knee pain is causally related to his work accident. I will see him back in 3 months.

## 2023-12-27 NOTE — HISTORY OF PRESENT ILLNESS
[4] : 4 [0] : 0 [Dull/Aching] : dull/aching [Stabbing] : stabbing [Intermittent] : intermittent [Household chores] : household chores [Work] : work [Rest] : rest [Sitting] : sitting [Standing] : standing [Walking] : walking [Stairs] : stairs [Not working due to injury] : Work status: not working due to injury [] : no [FreeTextEntry1] : Lt. Knee [FreeTextEntry5] : 56 y/o M presents for PO #4 eval. of Lt. knee. s/p Lt. TKA on DOS noted above. Pt reports pain levels remain the same since last visit. Persistent stiffness and swelling.  pt states last couple of months has pain going up and down stairs. [de-identified] : 6/26/23 [de-identified] : Dr. Golden [de-identified] : 3/14/2023 [de-identified] : 3/14/2023 [de-identified] : Lt. TKA

## 2024-02-12 ENCOUNTER — APPOINTMENT (OUTPATIENT)
Dept: ORTHOPEDIC SURGERY | Facility: CLINIC | Age: 59
End: 2024-02-12
Payer: OTHER MISCELLANEOUS

## 2024-02-12 VITALS — BODY MASS INDEX: 34.19 KG/M2 | WEIGHT: 275 LBS | HEIGHT: 75 IN

## 2024-02-12 PROCEDURE — 99213 OFFICE O/P EST LOW 20 MIN: CPT | Mod: ACP

## 2024-02-12 PROCEDURE — 73564 X-RAY EXAM KNEE 4 OR MORE: CPT | Mod: LT

## 2024-02-12 NOTE — HISTORY OF PRESENT ILLNESS
[Work related] : work related [4] : 4 [0] : 0 [Dull/Aching] : dull/aching [Stabbing] : stabbing [Intermittent] : intermittent [Household chores] : household chores [Work] : work [Sleep] : sleep [Rest] : rest [Meds] : meds [Sitting] : sitting [Standing] : standing [Walking] : walking [Stairs] : stairs [Lying in bed] : lying in bed [Not working due to injury] : Work status: not working due to injury [] : no [FreeTextEntry3] : 10/11/2011 [FreeTextEntry5] : 58 y/o M presents for f/u eval. of the Lt. knee today. s/p Lt. TKA on DOS noted above. Pt reports of increased pain levels since last visit. Advil as needed. Stiffness and minimal swelling. Numbness persists.  [FreeTextEntry7] : down Lt. leg

## 2024-02-12 NOTE — ASSESSMENT
[FreeTextEntry1] : The patient is 11 months out from his left total knee replacement for posttraumatic arthritis done on March 14, 2023. The patient has had increased pain without new trauma. He repors using advil and naproxen as needed with some relief. He wishes to restart PT. He has difficulty walking and doing stairs. He has new superficial anterior knee pain with decending stairs that is mild overall. He has occasional swelling. He is worse by the end of the day. He is currently not working. She denies new trauma.  Left knee exam: Neurovascularly intact. Sensation intact. Examination left knee reveals well-healed midline scar. His range of motion is from 0 to 120 degrees. There is 1+ effusion. There is mild diffuse joint line pain. There is no instability or apprehension. There is no patella crepitation.  Left knee xrays taken today in office, 4 views NWB - Left knee TKA hardware intact without loosening or fracture.  No obvious tumors, masses, or lesions  Plan at this time is to continue home exercises and activity modification. The patient will use voltaren gel for the anteiror knee pain as needed. He was prescribed naproxen. Auth for PT was submitted. He is unable to return to work at this time. He has 100% temporary disability to the left knee. His left knee pain is causally related to his work accident. I will see him back in 2-3 months.

## 2024-04-08 ENCOUNTER — RX RENEWAL (OUTPATIENT)
Age: 59
End: 2024-04-08

## 2024-04-10 ENCOUNTER — APPOINTMENT (OUTPATIENT)
Dept: ORTHOPEDIC SURGERY | Facility: CLINIC | Age: 59
End: 2024-04-10
Payer: OTHER MISCELLANEOUS

## 2024-04-10 VITALS — HEIGHT: 75 IN | BODY MASS INDEX: 34.19 KG/M2 | WEIGHT: 275 LBS

## 2024-04-10 DIAGNOSIS — M17.32 UNILATERAL POST-TRAUMATIC OSTEOARTHRITIS, LEFT KNEE: ICD-10-CM

## 2024-04-10 PROCEDURE — 99455 WORK RELATED DISABILITY EXAM: CPT

## 2024-04-10 NOTE — ASSESSMENT
[FreeTextEntry1] : The patient comes in today for follow-up on his left knee.  He is now more than a year out from his left total knee replacement done on March 10, 2023 for posttraumatic arthritis.  Overall he is doing better than he was prior to surgery but continues to struggle particular going up and down stairs.  He has an aching in the knee at rest and he occasionally needs a cane to get around.  He is currently not working.  He has not had any recent physical therapy.  Examination of the left lower extremity reveals normal neurovascular exam.  Examination left knee reveals a well-healed midline scar with no effusion.  There is no signs of infection or DVT.  His range of motion is from 0 to 120 degrees with normal patellar tracking.  He has mild crepitation but no apprehension.  He has no instability to varus or valgus stress in full extension or mid flexion.  He has a negative anterior and posterior drawer.  He does, however, have some atrophy of his calf measured at the mid substance of about an inch and a half compared to his opposite side.  At this point he has reached maximal medical improvement to the left knee with a schedule loss of use of 40%.  This is based on 35% for his left total knee replacement with good range of motion.  He gets an additional 5% for mild to moderate atrophy of his calf measured at mid substance against his other side.  He will not work at this time.  His left knee pain is causally related to his work accident.  I will see him back in the office as needed.

## 2024-04-10 NOTE — HISTORY OF PRESENT ILLNESS
[Work related] : work related [4] : 4 [0] : 0 [Dull/Aching] : dull/aching [Stabbing] : stabbing [Intermittent] : intermittent [Household chores] : household chores [Work] : work [Sleep] : sleep [Rest] : rest [Meds] : meds [Sitting] : sitting [Standing] : standing [Walking] : walking [Stairs] : stairs [Not working due to injury] : Work status: not working due to injury [] : yes [Localized] : localized [FreeTextEntry3] : 10/11/2011 [FreeTextEntry5] : 58 y/o M presents for f/u eval. of the Lt. knee today. Pt reports pain levels remain the same especially when using stairs.

## 2024-05-06 ENCOUNTER — RX RENEWAL (OUTPATIENT)
Age: 59
End: 2024-05-06

## 2024-05-31 ENCOUNTER — RX RENEWAL (OUTPATIENT)
Age: 59
End: 2024-05-31

## 2024-10-22 NOTE — ED PROVIDER NOTE - MUSCULOSKELETAL NECK EXAM
uses medicinal marijuana.
supple/no deformity, pain or tenderness. no restriction of movement/no vertebral point tenderness no pain on movements/trachea midline
